# Patient Record
Sex: FEMALE | Race: ASIAN
[De-identification: names, ages, dates, MRNs, and addresses within clinical notes are randomized per-mention and may not be internally consistent; named-entity substitution may affect disease eponyms.]

---

## 2020-09-02 ENCOUNTER — APPOINTMENT (OUTPATIENT)
Dept: PULMONOLOGY | Facility: CLINIC | Age: 40
End: 2020-09-02
Payer: COMMERCIAL

## 2020-09-02 VITALS
TEMPERATURE: 98.5 F | WEIGHT: 150 LBS | OXYGEN SATURATION: 96 % | DIASTOLIC BLOOD PRESSURE: 70 MMHG | SYSTOLIC BLOOD PRESSURE: 102 MMHG | HEIGHT: 68 IN | HEART RATE: 96 BPM | BODY MASS INDEX: 22.73 KG/M2

## 2020-09-02 PROCEDURE — 94727 GAS DIL/WSHOT DETER LNG VOL: CPT

## 2020-09-02 PROCEDURE — 94060 EVALUATION OF WHEEZING: CPT

## 2020-09-02 PROCEDURE — 99204 OFFICE O/P NEW MOD 45 MIN: CPT | Mod: 25

## 2020-09-02 PROCEDURE — 94729 DIFFUSING CAPACITY: CPT

## 2020-09-02 PROCEDURE — 36415 COLL VENOUS BLD VENIPUNCTURE: CPT

## 2020-09-02 NOTE — DISCUSSION/SUMMARY
[FreeTextEntry1] : She is a 40 year old woman complaining of dyspnea on exertion.\par \par She had been in excellent health until about May of 2020. Up to that time she was able to run without any problem. Was able to run 10 miles at a time. Has run several marathons in the past. In May 2020 she got two pet dogs. Her problem seemed to start around this time. \par \par Physical examination was unremarkable. PFT suggested restriction. Pneumonitis and or ILD is a consideration. Blood work obtained. Chest x-ray requested. May need to have CT.\par \par Further recommendations to follow. Follow up in two weeks.  \par

## 2020-09-02 NOTE — HISTORY OF PRESENT ILLNESS
[Never] : never [Former] : former [TextBox_4] : She is a 40 year old woman complaining of dyspnea on exertion.\par \par She had been in excellent health until about May of 2020. Up to that time she was able to run without any problem. Was able to run 10 miles at a time. Has run several marathons in the past. In May 2020 she got two pet dogs. Her problem seemed to start around this time. \par \par No prior history of pulmonary disease. Stopped smoking three years ago.  [YearQuit] : 2017

## 2020-09-02 NOTE — REVIEW OF SYSTEMS
[SOB on Exertion] : sob on exertion [Fever] : no fever [Wheezing] : no wheezing [Nasal Congestion] : no nasal congestion [Cough] : no cough [Chest Discomfort] : no chest discomfort [Edema] : no edema [GERD] : no gerd [Dysuria] : no dysuria [Myalgias] : no myalgias [Raynaud] : no raynaud [Headache] : no headache [Anxiety] : no anxiety [Diabetes] : no diabetes [Thyroid Problem] : no thyroid problem

## 2020-09-02 NOTE — PHYSICAL EXAM
[No Acute Distress] : no acute distress [No Neck Mass] : no neck mass [Normal S1, S2] : normal s1, s2 [Clear to Auscultation Bilaterally] : clear to auscultation bilaterally [No Abnormalities] : no abnormalities [Normal Gait] : normal gait [No HSM] : no hsm [No Clubbing] : no clubbing [No Focal Deficits] : no focal deficits [Oriented x3] : oriented x3

## 2020-09-02 NOTE — PROCEDURE
[FreeTextEntry1] : PFT 9/2/20: No obstruction. Moderate restriction. Moderate reduction in diffusion. No significant improvement after bronchodilator.

## 2020-09-04 LAB
ALBUMIN SERPL ELPH-MCNC: 4.3 G/DL
ALP BLD-CCNC: 43 U/L
ALT SERPL-CCNC: 64 U/L
ANION GAP SERPL CALC-SCNC: 9 MMOL/L
AST SERPL-CCNC: 60 U/L
BASOPHILS # BLD AUTO: 0.06 K/UL
BASOPHILS NFR BLD AUTO: 0.9 %
BILIRUB SERPL-MCNC: 0.6 MG/DL
BUN SERPL-MCNC: 8 MG/DL
CALCIUM SERPL-MCNC: 8.7 MG/DL
CHLORIDE SERPL-SCNC: 104 MMOL/L
CO2 SERPL-SCNC: 26 MMOL/L
CREAT SERPL-MCNC: 0.61 MG/DL
CRP SERPL HS-MCNC: 2.57 MG/L
EOSINOPHIL # BLD AUTO: 0.34 K/UL
EOSINOPHIL NFR BLD AUTO: 4.8 %
GLUCOSE SERPL-MCNC: 101 MG/DL
HCT VFR BLD CALC: 44.2 %
HGB BLD-MCNC: 13.7 G/DL
IMM GRANULOCYTES NFR BLD AUTO: 0.1 %
LYMPHOCYTES # BLD AUTO: 1.68 K/UL
LYMPHOCYTES NFR BLD AUTO: 23.9 %
MAN DIFF?: NORMAL
MCHC RBC-ENTMCNC: 29.4 PG
MCHC RBC-ENTMCNC: 31 GM/DL
MCV RBC AUTO: 94.8 FL
MONOCYTES # BLD AUTO: 0.71 K/UL
MONOCYTES NFR BLD AUTO: 10.1 %
NEUTROPHILS # BLD AUTO: 4.22 K/UL
NEUTROPHILS NFR BLD AUTO: 60.2 %
PLATELET # BLD AUTO: 252 K/UL
POTASSIUM SERPL-SCNC: 4.4 MMOL/L
PROT SERPL-MCNC: 6.8 G/DL
RBC # BLD: 4.66 M/UL
RBC # FLD: 14 %
SARS-COV-2 IGG SERPL IA-ACNC: 0.05 INDEX
SARS-COV-2 IGG SERPL QL IA: NEGATIVE
SODIUM SERPL-SCNC: 140 MMOL/L
WBC # FLD AUTO: 7.02 K/UL

## 2020-09-18 ENCOUNTER — LABORATORY RESULT (OUTPATIENT)
Age: 40
End: 2020-09-18

## 2020-09-18 ENCOUNTER — APPOINTMENT (OUTPATIENT)
Dept: PULMONOLOGY | Facility: CLINIC | Age: 40
End: 2020-09-18
Payer: COMMERCIAL

## 2020-09-18 VITALS
OXYGEN SATURATION: 95 % | HEART RATE: 72 BPM | BODY MASS INDEX: 22.5 KG/M2 | DIASTOLIC BLOOD PRESSURE: 60 MMHG | WEIGHT: 148 LBS | SYSTOLIC BLOOD PRESSURE: 120 MMHG | RESPIRATION RATE: 16 BRPM | TEMPERATURE: 98.4 F

## 2020-09-18 PROCEDURE — 99215 OFFICE O/P EST HI 40 MIN: CPT | Mod: 25

## 2020-09-18 PROCEDURE — 36415 COLL VENOUS BLD VENIPUNCTURE: CPT

## 2020-09-19 LAB
ALBUMIN SERPL ELPH-MCNC: 4.2 G/DL
ALP BLD-CCNC: 40 U/L
ALT SERPL-CCNC: 56 U/L
ANION GAP SERPL CALC-SCNC: 14 MMOL/L
APPEARANCE: ABNORMAL
AST SERPL-CCNC: 54 U/L
BASOPHILS # BLD AUTO: 0.05 K/UL
BASOPHILS NFR BLD AUTO: 0.9 %
BILIRUB SERPL-MCNC: 0.7 MG/DL
BILIRUBIN URINE: NEGATIVE
BLOOD URINE: ABNORMAL
BUN SERPL-MCNC: 11 MG/DL
CALCIUM SERPL-MCNC: 8.6 MG/DL
CHLORIDE SERPL-SCNC: 106 MMOL/L
CK SERPL-CCNC: 1232 U/L
CO2 SERPL-SCNC: 22 MMOL/L
COLOR: YELLOW
CREAT SERPL-MCNC: 0.6 MG/DL
EOSINOPHIL # BLD AUTO: 0.35 K/UL
EOSINOPHIL NFR BLD AUTO: 6.5 %
GLUCOSE QUALITATIVE U: NEGATIVE
GLUCOSE SERPL-MCNC: 113 MG/DL
HCT VFR BLD CALC: 40 %
HGB BLD-MCNC: 12.8 G/DL
IMM GRANULOCYTES NFR BLD AUTO: 0.4 %
KETONES URINE: NEGATIVE
LEUKOCYTE ESTERASE URINE: NEGATIVE
LYMPHOCYTES # BLD AUTO: 1.47 K/UL
LYMPHOCYTES NFR BLD AUTO: 27.3 %
MAN DIFF?: NORMAL
MCHC RBC-ENTMCNC: 29.3 PG
MCHC RBC-ENTMCNC: 32 GM/DL
MCV RBC AUTO: 91.5 FL
MONOCYTES # BLD AUTO: 0.55 K/UL
MONOCYTES NFR BLD AUTO: 10.2 %
NEUTROPHILS # BLD AUTO: 2.95 K/UL
NEUTROPHILS NFR BLD AUTO: 54.7 %
NITRITE URINE: NEGATIVE
PH URINE: 7.5
PLATELET # BLD AUTO: 259 K/UL
POTASSIUM SERPL-SCNC: 4 MMOL/L
PROT SERPL-MCNC: 6.8 G/DL
PROTEIN URINE: NORMAL
RBC # BLD: 4.37 M/UL
RBC # FLD: 13.3 %
RHEUMATOID FACT SER QL: <10 IU/ML
SODIUM SERPL-SCNC: 142 MMOL/L
SPECIFIC GRAVITY URINE: 1.02
UROBILINOGEN URINE: ABNORMAL
WBC # FLD AUTO: 5.39 K/UL

## 2020-09-19 NOTE — DISCUSSION/SUMMARY
[FreeTextEntry1] : She is a 40 year old woman who first noted dyspnea on exertion in April of 2020. Prior to this she had been in excellent health. See the more detailed history noted above. \par \par On examination the palms of her hand have become dry and scaly. Mild fissuring was noted. The hand appear to be slightly swollen. \par \par CT Chest from 9/15/20 shoed increased bibasilar subpleural markings with ground glass opacity and traction bronchiectasis. No honeycombing. No lymphadenopathy. No pleural effusion. 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 \par \par Blood work including myositis specific antibodies will be obtained. It was suggested she be seen at an interstitial lung disease program by an multidisciplinary team. A rheumatology consultation is also advisable.\par \par In addition she recently had a mammogram with an abnormal finding. This should be evaluated by a breast surgeon. Dermatomyositis can be a harbinger for malignancy. \par \par I will follow up with her shortly. \par .  \par

## 2020-09-19 NOTE — PHYSICAL EXAM
[No Neck Mass] : no neck mass [Normal S1, S2] : normal s1, s2 [No HSM] : no hsm [Normal Gait] : normal gait [No Clubbing] : no clubbing [No Focal Deficits] : no focal deficits [Oriented x3] : oriented x3 [Normal Appearance] : normal appearance [No Resp Distress] : no resp distress [Normal Affect] : normal affect [Normal Turgor] : normal turgor [TextBox_132] : Dry scaly skin noted on both palms.

## 2020-09-19 NOTE — HISTORY OF PRESENT ILLNESS
[Former] : former [Never] : never [TextBox_4] : She is a 40 year old woman who presented with dyspnea on exertion which was first noted in April 2020. \par \par Prior to this she had been in good health. She was born in [place] and came to the [place] at the age of 2. She does not recall any childhood illnesses. She has been in excellent health and has been able to run marathons. In April of 2019 she ran a full 26 mile marathon in [place] and another one in November of 2019 in [place]. Earlier this year she was able to run 10 miles without difficulty. In about April of 2020 she started noticing that she could no longer run so well without becoming short of breath. Now she has difficulty in running a 5K race and took her almost one hour to do this.\par \par She has been working from home due to the coronavirus pandemic. Prior to this she was working in [place] and commuting via the subway. In May of this year she adopted two dogs. She has never been allergic to dogs. She has no other pets. She lives in a two-family home with her boyfriend. She resides on the first floor. The home is heated with radiators and there are window units for air conditioning. She maintains these well. She also has two HEPA filters.\par \par She stopped smoking in about 2017. She does not take any medications. She does not have any foreign bodies within her. She traveled to [place] in 2017.  [YearQuit] : 2017

## 2020-09-19 NOTE — REVIEW OF SYSTEMS
[SOB on Exertion] : sob on exertion [Fever] : no fever [Fatigue] : no fatigue [Nasal Congestion] : no nasal congestion [Cough] : no cough [Hemoptysis] : no hemoptysis [Wheezing] : no wheezing [Chest Discomfort] : no chest discomfort [Edema] : no edema [Palpitations] : no palpitations [GERD] : no gerd [Dysuria] : no dysuria [Arthralgias] : no arthralgias [Myalgias] : no myalgias [Raynaud] : no raynaud [Rash] : no rash [Headache] : no headache [Anxiety] : no anxiety [Diabetes] : no diabetes [Thyroid Problem] : no thyroid problem

## 2020-09-19 NOTE — PROCEDURE
[FreeTextEntry1] : PFT 9/2/20: No obstruction. Moderate restriction. Moderate reduction in diffusion. No significant improvement after bronchodilator. \par \par CT Chest 9/15/20: Increased bibasilar subpleural markings with ground glass opacity and traction bronchiectasis. No honeycombing. No lymphadenopathy. No pleural effusion.

## 2020-09-21 ENCOUNTER — LABORATORY RESULT (OUTPATIENT)
Age: 40
End: 2020-09-21

## 2020-09-21 ENCOUNTER — APPOINTMENT (OUTPATIENT)
Dept: PULMONOLOGY | Facility: CLINIC | Age: 40
End: 2020-09-21
Payer: COMMERCIAL

## 2020-09-21 VITALS
WEIGHT: 148 LBS | OXYGEN SATURATION: 96 % | HEART RATE: 62 BPM | SYSTOLIC BLOOD PRESSURE: 104 MMHG | DIASTOLIC BLOOD PRESSURE: 64 MMHG | BODY MASS INDEX: 22.5 KG/M2 | TEMPERATURE: 97.9 F

## 2020-09-21 DIAGNOSIS — Z80.1 FAMILY HISTORY OF MALIGNANT NEOPLASM OF TRACHEA, BRONCHUS AND LUNG: ICD-10-CM

## 2020-09-21 LAB
ACE BLD-CCNC: 24 U/L
CK MB BLD-MCNC: 24.4 NG/ML
CK SERPL-CCNC: 1063 U/L
ENA SCL70 IGG SER IA-ACNC: <0.2 AL
MPO AB + PR3 PNL SER: NORMAL

## 2020-09-21 PROCEDURE — 99213 OFFICE O/P EST LOW 20 MIN: CPT

## 2020-09-21 NOTE — DISCUSSION/SUMMARY
[FreeTextEntry1] : She is a 40 year old woman who first noted dyspnea on exertion in April of 2020. Prior to this she had been in excellent health. See the more detailed history noted above. \par \par On examination the palms of her hand have become dry and scaly. Mild fissuring was noted. The hands appear to be slightly swollen. Multiple papules noted on the back of her neck. \par \par CT Chest from 9/15/20 shoed increased bibasilar subpleural markings with ground glass opacity and traction bronchiectasis. No honeycombing. No lymphadenopathy. No pleural effusion. 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 \par \par Blood work including myositis specific antibodies will be obtained. It was suggested she be seen at an interstitial lung disease program by an multidisciplinary team. A rheumatology consultation is also advisable.\par \par I suggest she keep a photographic record of her skin findings. \par \par Echocardiogram requested. \par \par In addition she recently had a mammogram with an abnormal finding. This should be evaluated by a breast surgeon. Dermatomyositis may be related to malignancy. \par \par Further recommendations to follow the results of the above. \par

## 2020-09-21 NOTE — REVIEW OF SYSTEMS
[SOB on Exertion] : sob on exertion [Fever] : no fever [Fatigue] : no fatigue [Chills] : no chills [Nasal Congestion] : no nasal congestion [Cough] : no cough [Hemoptysis] : no hemoptysis [Wheezing] : no wheezing [Chest Discomfort] : no chest discomfort [Edema] : no edema [Palpitations] : no palpitations [GERD] : no gerd [Dysphagia] : no dysphagia [Dysuria] : no dysuria [Arthralgias] : no arthralgias [Myalgias] : no myalgias [Raynaud] : no raynaud [Rash] : no rash [Headache] : no headache [Anxiety] : no anxiety [Diabetes] : no diabetes [Thyroid Problem] : no thyroid problem [Obesity] : no obesity

## 2020-09-21 NOTE — HISTORY OF PRESENT ILLNESS
[Former] : former [Never] : never [TextBox_4] : She is a 40 year old woman who presented with dyspnea on exertion which was first noted in April 2020. \par \par Prior to this she had been in good health. She was born in [place] and came [place] the [place] at the age of 2. She does not recall any childhood illnesses. She has been in excellent health and has been able to run marathons. In April of 2019 she ran a full 26 mile marathon in [place] and another one in November of 2019 in [place]. Earlier this year she was able to run 10 miles without difficulty. In about April of 2020 she started noticing that she could no longer run so well without becoming short of breath. Now she has difficulty in running a 5K race and took her almost one hour to do this. No myalgias reported. No Raynaud's phenomenon. No hair loss noted. No dysphagia. \par \par She has been working from home due to the coronavirus pandemic. Prior to this she was working in [place] and commuting via the subway. In May of this year she adopted two dogs. She has never been allergic to dogs. She has no other pets. She lives in a two-family home with her boyfriend. She resides on the first floor. The home is heated with radiators and there are window units for air conditioning. She maintains these well. She also has two HEPA filters.\par \par She stopped smoking in about 2017. She does not take any medications. She does not have any foreign bodies within her. She traveled to [place] in 2017.  [YearQuit] : 2017

## 2020-09-21 NOTE — PHYSICAL EXAM
[Normal Appearance] : normal appearance [No Neck Mass] : no neck mass [Normal S1, S2] : normal s1, s2 [No Resp Distress] : no resp distress [No HSM] : no hsm [Normal Gait] : normal gait [No Clubbing] : no clubbing [Normal Turgor] : normal turgor [No Focal Deficits] : no focal deficits [Oriented x3] : oriented x3 [Normal Affect] : normal affect [TextBox_125] : Discrete dark papules noted on the back of her neck. She attributed these to insect bites.  [TextBox_132] : Dry scaly skin noted on both palms.

## 2020-09-22 LAB
ENA JO1 AB SER IA-ACNC: <0.2 AL
HIV1+2 AB SPEC QL IA.RAPID: NONREACTIVE

## 2020-09-23 LAB
ALTERN TENCAPG(M6): <2 MCG/ML
ANA PAT FLD IF-IMP: ABNORMAL
ANA SER IF-ACNC: ABNORMAL
ASPER FUMCAPG(M3): 7.4 MCG/ML
AUREOBASCAPG(M12): <2 MCG/ML
MICROPOLYCAPG(M22): <2 MCG/ML
PENIC CHRYCAPG(M1): 6.6 MCG/ML
PHOMA BETAE IGG: 2.1 MCG/ML
THERMOCAPG(M23): 7 MCG/ML
TRICHODERMA VIRIDE IGG: <2 MCG/ML

## 2020-09-24 LAB
M TB IFN-G BLD-IMP: NEGATIVE
QUANTIFERON TB PLUS MITOGEN MINUS NIL: 8.04 IU/ML
QUANTIFERON TB PLUS NIL: 0.03 IU/ML
QUANTIFERON TB PLUS TB1 MINUS NIL: 0 IU/ML
QUANTIFERON TB PLUS TB2 MINUS NIL: 0 IU/ML

## 2020-09-25 ENCOUNTER — LABORATORY RESULT (OUTPATIENT)
Age: 40
End: 2020-09-25

## 2020-09-25 ENCOUNTER — APPOINTMENT (OUTPATIENT)
Dept: RHEUMATOLOGY | Facility: CLINIC | Age: 40
End: 2020-09-25
Payer: COMMERCIAL

## 2020-09-25 VITALS
BODY MASS INDEX: 22.43 KG/M2 | TEMPERATURE: 97.9 F | HEART RATE: 68 BPM | RESPIRATION RATE: 16 BRPM | WEIGHT: 148 LBS | HEIGHT: 68 IN | DIASTOLIC BLOOD PRESSURE: 71 MMHG | SYSTOLIC BLOOD PRESSURE: 111 MMHG | OXYGEN SATURATION: 98 %

## 2020-09-25 PROCEDURE — 99204 OFFICE O/P NEW MOD 45 MIN: CPT

## 2020-09-26 LAB
LDH SERPL-CCNC: 318 U/L
LDH1 CFR SERPL ELPH: 16 %
LDH2 CFR SERPL ELPH: 38 %
LDH3 CFR SERPL ELPH: 25 %
LDH4 CFR SERPL ELPH: 10 %
LDH5 CFR SERPL ELPH: 10 %

## 2020-10-06 ENCOUNTER — APPOINTMENT (OUTPATIENT)
Dept: PEDIATRIC ALLERGY IMMUNOLOGY | Facility: CLINIC | Age: 40
End: 2020-10-06
Payer: COMMERCIAL

## 2020-10-06 VITALS
DIASTOLIC BLOOD PRESSURE: 62 MMHG | TEMPERATURE: 97.6 F | HEART RATE: 67 BPM | WEIGHT: 146 LBS | BODY MASS INDEX: 22.2 KG/M2 | OXYGEN SATURATION: 98 % | SYSTOLIC BLOOD PRESSURE: 111 MMHG

## 2020-10-06 PROCEDURE — 95004 PERQ TESTS W/ALRGNC XTRCS: CPT

## 2020-10-06 PROCEDURE — 99204 OFFICE O/P NEW MOD 45 MIN: CPT | Mod: 25

## 2020-10-06 PROCEDURE — 36415 COLL VENOUS BLD VENIPUNCTURE: CPT

## 2020-10-06 NOTE — IMPRESSION
[Allergy Testing Dust Mite] : dust mites [Allergy Testing Cockroach] : cockroach [Allergy Testing Dog] : dog [Allergy Testing Cat] : cat [Allergy Testing Trees] : trees [Allergy Testing Weeds] : weeds [Allergy Testing Mixed Feathers] : feathers [] : molds [Allergy Testing Grasses] : grasses

## 2020-10-06 NOTE — PHYSICAL EXAM
[Alert] : alert [Well Nourished] : well nourished [Healthy Appearance] : healthy appearance [No Acute Distress] : no acute distress [Well Developed] : well developed [Normal Voice/Communication] : normal voice communication [No Discharge] : no discharge [No Photophobia] : no photophobia [Sclera Not Icteric] : sclera not icteric [Conjunctival Erythema] : no conjunctival erythema [Suborbital Bogginess] : no suborbital bogginess (allergic shiners) [Normal TMs] : both tympanic membranes were normal [Normal Lips/Tongue] : the lips and tongue were normal [Normal Outer Ear/Nose] : the ears and nose were normal in appearance [No Thrush] : no thrush [No Oral Lesions or Ulcers] : no oral lesions or ulcers [Pale mucosa] : pale mucosa [Boggy Nasal Turbinates] : boggy and/or pale nasal turbinates [Pharyngeal erythema] : no pharyngeal erythema [Posterior Pharyngeal Cobblestoning] : posterior pharyngeal cobblestoning [Exudate] : no exudate [No Neck Mass] : no neck mass was observed [Supple] : the neck was supple [Normal Rate and Effort] : normal respiratory rhythm and effort [No Crackles] : no crackles [Bilateral Audible Breath Sounds] : bilateral audible breath sounds [Wheezing] : no wheezing was heard [Normal Rate] : heart rate was normal  [Normal S1, S2] : normal S1 and S2 [Regular Rhythm] : with a regular rhythm [Soft] : abdomen soft [Not Tender] : non-tender [No HSM] : no hepato-splenomegaly [Normal Cervical Lymph Nodes] : cervical [Normal Axillary Lumph Nodes] : axillary [No Skin Lesions] : no skin lesions [Eczematous Patches] : eczematous patches present [Xerosis] : xerosis [No Edema] : no edema [No Cyanosis] : no cyanosis [Normal Mood] : mood was normal [Normal Affect] : affect was normal [Alert, Awake, Oriented as Age-Appropriate] : alert, awake, oriented as age appropriate [de-identified] : dry, hyperkeratotic lesions on b/l hands, fingers, lower back

## 2020-10-06 NOTE — CONSULT LETTER
[Dear  ___] : Dear  [unfilled], [Consult Letter:] : I had the pleasure of evaluating your patient, [unfilled]. [Please see my note below.] : Please see my note below. [Consult Closing:] : Thank you very much for allowing me to participate in the care of this patient.  If you have any questions, please do not hesitate to contact me. [Sincerely,] : Sincerely, [FreeTextEntry3] : Tatum Motta MD FAAPRESTON, DAMON\par Adult and Pediatric Allergy, Asthma and Clinical Immunology\par  of Medicine and Pediatrics at\par   [facility]\par Section Head, Adult Allergy and Immunology\par   [facility]\par   Division of Allergy, Asthma and Immunology\par   [address] [place] [address], [place] [address]\par   [place] [address]\par   Phone [phone]  Fax [phone]\par \par   [DrSunshine  ___] : Dr. MARADIAGA

## 2020-10-06 NOTE — HISTORY OF PRESENT ILLNESS
[Venom Reactions] : venom reactions [Food Allergies] : food allergies [de-identified] : ERIC MONTERO is a 40 year  old female, presents for allergy evaluation. \par \par She had been in excellent health until about May of 2020. Up to that time she was able to run regularly, including  marathon and half- marathons without any problem.  Her respiratory symptoms have been getting progressively worse. She is seeing Dr Eller, who diagnosed her with ILD. She tried Advair without any improvement in her respiratory symptoms. \par - spirometry: restrictive pattern with DLCO-45%\par - Chest CT report reviewed: subpleural  interstitial densities, ground-glass opacities and traction bronchiectasis (b/l, more in LL)\par - In May 2020 she got two pet dogs. She has multiple potted plants in her living room. 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  \par - Was tested for COVID in June and July - negative, COVID antibody - negative\par - former smoker 20 years 1/2 PPD , quit 3 years ago \par - Has been having rashes on the hands (fingers, knuckles) and lower back. \par - she also reports weight loss about 17 lb since 5/2020\par - she had breast biopsy recently- benign\par \par \par rash

## 2020-10-06 NOTE — REVIEW OF SYSTEMS
[Eye Itching] : itchy eyes [SOB with Exertion] : dyspnea on exertion [Congested In The Chest] : feeling ~L congested in the chest [Nl] : Endocrine [Fatigue] : no fatigue [Fever] : no fever [Rhinorrhea] : no rhinorrhea [Nasal Congestion] : no nasal congestion [Post Nasal Drip] : no post nasal drip [Sneezing] : no sneezing [SOB at Rest] : no shortness of breath at rest [Cough] : no cough [Sputum Production] : not coughing up sputum [Wheezing] : no wheezing [Seizure] : no seizures [Headache] : no headache [Joint Pains] : no arthralgias [Joint Swelling] : no joint swelling [Easy Bruising] : no tendency for easy bruising [Easy Bleeding] : no ~M tendency for easy bleeding [Recurrent Sinus Infections] : no recurrent sinus infections [Recurrent Throat Infections] : no recurrence of throat infections [Recurrent Bronchitis] : no recurrent bronchitis [Recurrent Ear Infections] : no recurrence or ear infections [Recurrent Skin Infections] : no recurrent skin infections [Recurrent Pneumonia] : no ~T recurrent pneumonia [de-identified] : rash

## 2020-10-07 LAB
CD16+CD56+ CELLS # BLD: 222 /UL
CD16+CD56+ CELLS NFR BLD: 15 %
CD19 CELLS NFR BLD: 215 /UL
CD3 CELLS # BLD: 999 /UL
CD3 CELLS NFR BLD: 68 %
CD3+CD4+ CELLS # BLD: 587 /UL
CD3+CD4+ CELLS NFR BLD: 41 %
CD3+CD4+ CELLS/CD3+CD8+ CLL SPEC: 2.59 RATIO
CD3+CD8+ CELLS # SPEC: 227 /UL
CD3+CD8+ CELLS NFR BLD: 16 %
CELLS.CD3-CD19+/CELLS IN BLOOD: 14 %
CH50 SERPL-MCNC: 71 U/ML
M PROTEIN SPEC IFE-MCNC: NORMAL

## 2020-10-09 LAB
C TETANI IGG SER-ACNC: 1.42 IU/ML
COMPLEMENT, ALTERNATE PATHWAY (AH50): 57

## 2020-10-12 LAB — MANNAN BINDING LECTIN (MBL): >4000 NG/ML

## 2020-10-20 ENCOUNTER — APPOINTMENT (OUTPATIENT)
Dept: RHEUMATOLOGY | Facility: CLINIC | Age: 40
End: 2020-10-20
Payer: COMMERCIAL

## 2020-10-20 VITALS
BODY MASS INDEX: 22.28 KG/M2 | DIASTOLIC BLOOD PRESSURE: 69 MMHG | SYSTOLIC BLOOD PRESSURE: 108 MMHG | HEART RATE: 77 BPM | TEMPERATURE: 98 F | WEIGHT: 147 LBS | HEIGHT: 68 IN | OXYGEN SATURATION: 95 %

## 2020-10-20 PROCEDURE — 99072 ADDL SUPL MATRL&STAF TM PHE: CPT

## 2020-10-20 PROCEDURE — 99214 OFFICE O/P EST MOD 30 MIN: CPT | Mod: 25

## 2020-10-21 LAB
ALBUMIN SERPL ELPH-MCNC: 4.4 G/DL
ALP BLD-CCNC: 47 U/L
ALT SERPL-CCNC: 27 U/L
ANION GAP SERPL CALC-SCNC: 19 MMOL/L
AST SERPL-CCNC: 21 U/L
BASOPHILS # BLD AUTO: 0.05 K/UL
BASOPHILS NFR BLD AUTO: 0.4 %
BILIRUB SERPL-MCNC: 0.4 MG/DL
BUN SERPL-MCNC: 18 MG/DL
CALCIUM SERPL-MCNC: 9.4 MG/DL
CHLORIDE SERPL-SCNC: 99 MMOL/L
CK SERPL-CCNC: 145 U/L
CO2 SERPL-SCNC: 22 MMOL/L
CREAT SERPL-MCNC: 0.64 MG/DL
CRP SERPL-MCNC: <0.1 MG/DL
EOSINOPHIL # BLD AUTO: 0.08 K/UL
EOSINOPHIL # BLD MANUAL: 1130 /UL
EOSINOPHIL NFR BLD AUTO: 0.6 %
ERYTHROCYTE [SEDIMENTATION RATE] IN BLOOD BY WESTERGREN METHOD: 9 MM/HR
GLUCOSE SERPL-MCNC: 117 MG/DL
HCT VFR BLD CALC: 44.8 %
HGB BLD-MCNC: 13.5 G/DL
IMM GRANULOCYTES NFR BLD AUTO: 0.5 %
LYMPHOCYTES # BLD AUTO: 1.99 K/UL
LYMPHOCYTES NFR BLD AUTO: 14.5 %
MAN DIFF?: NORMAL
MCHC RBC-ENTMCNC: 29.2 PG
MCHC RBC-ENTMCNC: 30.1 GM/DL
MCV RBC AUTO: 96.8 FL
MONOCYTES # BLD AUTO: 0.45 K/UL
MONOCYTES NFR BLD AUTO: 3.3 %
NEUTROPHILS # BLD AUTO: 11.1 K/UL
NEUTROPHILS NFR BLD AUTO: 80.7 %
PLATELET # BLD AUTO: 288 K/UL
POTASSIUM SERPL-SCNC: 4.7 MMOL/L
PROT SERPL-MCNC: 6.8 G/DL
RBC # BLD: 4.63 M/UL
RBC # FLD: 14.6 %
SODIUM SERPL-SCNC: 140 MMOL/L
WBC # FLD AUTO: 13.74 K/UL

## 2020-10-23 LAB — MYOGLOBIN SERPL-MCNC: 77 NG/ML

## 2020-10-23 RX ORDER — FLUTICASONE PROPIONATE AND SALMETEROL XINAFOATE 45; 21 UG/1; UG/1
45-21 AEROSOL, METERED RESPIRATORY (INHALATION)
Qty: 12 | Refills: 0 | Status: COMPLETED | COMMUNITY
Start: 2020-08-18

## 2020-10-30 ENCOUNTER — RX RENEWAL (OUTPATIENT)
Age: 40
End: 2020-10-30

## 2020-11-13 ENCOUNTER — APPOINTMENT (OUTPATIENT)
Dept: RHEUMATOLOGY | Facility: CLINIC | Age: 40
End: 2020-11-13
Payer: COMMERCIAL

## 2020-11-13 VITALS
DIASTOLIC BLOOD PRESSURE: 85 MMHG | TEMPERATURE: 97.5 F | HEART RATE: 74 BPM | BODY MASS INDEX: 22.13 KG/M2 | OXYGEN SATURATION: 95 % | WEIGHT: 146 LBS | HEIGHT: 68 IN | SYSTOLIC BLOOD PRESSURE: 121 MMHG

## 2020-11-13 PROCEDURE — 99214 OFFICE O/P EST MOD 30 MIN: CPT | Mod: 25

## 2020-11-13 PROCEDURE — 99072 ADDL SUPL MATRL&STAF TM PHE: CPT

## 2020-11-14 ENCOUNTER — RX RENEWAL (OUTPATIENT)
Age: 40
End: 2020-11-14

## 2020-11-14 LAB
ALBUMIN SERPL ELPH-MCNC: 4 G/DL
ALP BLD-CCNC: 37 U/L
ALT SERPL-CCNC: 19 U/L
ANION GAP SERPL CALC-SCNC: 13 MMOL/L
AST SERPL-CCNC: 15 U/L
BASOPHILS # BLD AUTO: 0.04 K/UL
BASOPHILS NFR BLD AUTO: 0.4 %
BILIRUB SERPL-MCNC: 0.4 MG/DL
BUN SERPL-MCNC: 17 MG/DL
CALCIUM SERPL-MCNC: 9.1 MG/DL
CHLORIDE SERPL-SCNC: 103 MMOL/L
CK SERPL-CCNC: 89 U/L
CO2 SERPL-SCNC: 23 MMOL/L
CREAT SERPL-MCNC: 0.81 MG/DL
CRP SERPL-MCNC: <0.1 MG/DL
ENA SS-A AB SER IA-ACNC: <0.2 AL
ENA SS-B AB SER IA-ACNC: <0.2 AL
EOSINOPHIL # BLD AUTO: 0.09 K/UL
EOSINOPHIL # BLD MANUAL: 100 /UL
EOSINOPHIL NFR BLD AUTO: 0.9 %
ERYTHROCYTE [SEDIMENTATION RATE] IN BLOOD BY WESTERGREN METHOD: 6 MM/HR
FERRITIN SERPL-MCNC: 43 NG/ML
GLUCOSE SERPL-MCNC: 90 MG/DL
HCT VFR BLD CALC: 42.3 %
HGB BLD-MCNC: 13.4 G/DL
IMM GRANULOCYTES NFR BLD AUTO: 0.3 %
LDH SERPL-CCNC: 215 U/L
LYMPHOCYTES # BLD AUTO: 3.61 K/UL
LYMPHOCYTES NFR BLD AUTO: 34.9 %
MAN DIFF?: NORMAL
MCHC RBC-ENTMCNC: 30.7 PG
MCHC RBC-ENTMCNC: 31.7 GM/DL
MCV RBC AUTO: 96.8 FL
MONOCYTES # BLD AUTO: 0.79 K/UL
MONOCYTES NFR BLD AUTO: 7.6 %
NEUTROPHILS # BLD AUTO: 5.79 K/UL
NEUTROPHILS NFR BLD AUTO: 55.9 %
PLATELET # BLD AUTO: 262 K/UL
POTASSIUM SERPL-SCNC: 4.1 MMOL/L
PROT SERPL-MCNC: 6.2 G/DL
RBC # BLD: 4.37 M/UL
RBC # FLD: 14.6 %
SODIUM SERPL-SCNC: 139 MMOL/L
WBC # FLD AUTO: 10.35 K/UL

## 2020-11-16 LAB — IGE SER-MCNC: 39 KU/L

## 2020-11-18 ENCOUNTER — APPOINTMENT (OUTPATIENT)
Dept: PEDIATRIC ALLERGY IMMUNOLOGY | Facility: CLINIC | Age: 40
End: 2020-11-18
Payer: COMMERCIAL

## 2020-11-18 VITALS
OXYGEN SATURATION: 96 % | HEIGHT: 68 IN | SYSTOLIC BLOOD PRESSURE: 118 MMHG | BODY MASS INDEX: 22.28 KG/M2 | WEIGHT: 147 LBS | DIASTOLIC BLOOD PRESSURE: 81 MMHG | HEART RATE: 75 BPM | TEMPERATURE: 96.6 F

## 2020-11-18 PROCEDURE — 36415 COLL VENOUS BLD VENIPUNCTURE: CPT

## 2020-11-18 PROCEDURE — 99214 OFFICE O/P EST MOD 30 MIN: CPT | Mod: 25

## 2020-11-20 NOTE — END OF VISIT
[FreeTextEntry3] : I personally discussed this patient with the PA at the time of the visit. I agree with assessment and plan as written unless noted below. \par I was present with the PA during the key portions of the history and exam. I agree with the findings and plan as documented in the PA's note, unless noted below.   \par I was present during entire procedure and assisted PA as needed.\par \par \par 39 yo with ILD, MBL >4K, Allergic Rhinitis:\par - she removed plants from home and started prednisone within couple of days, unclear if removing plants was helpful, effect is due to steroids. Counseling provided. \par - polyclonal gammopathy\par  \par

## 2020-11-20 NOTE — PHYSICAL EXAM
[Alert] : alert [Well Nourished] : well nourished [No Acute Distress] : no acute distress [Well Developed] : well developed [Sclera Not Icteric] : sclera not icteric [Normal TMs] : both tympanic membranes were normal [Normal Tonsils] : normal tonsils [Normal Rate and Effort] : normal respiratory rhythm and effort [No Crackles] : no crackles [Bilateral Audible Breath Sounds] : bilateral audible breath sounds [Normal Rate] : heart rate was normal  [Normal S1, S2] : normal S1 and S2 [Regular Rhythm] : with a regular rhythm [Normal Mood] : mood was normal [Conjunctival Erythema] : no conjunctival erythema [Boggy Nasal Turbinates] : no boggy and/or pale nasal turbinates [Pharyngeal erythema] : no pharyngeal erythema [Posterior Pharyngeal Cobblestoning] : no posterior pharyngeal cobblestoning [Clear Rhinorrhea] : no clear rhinorrhea was seen [Exudate] : no exudate [Wheezing] : no wheezing was heard

## 2020-11-20 NOTE — REVIEW OF SYSTEMS
[Nl] : Integumentary [Fatigue] : no fatigue [Puffy Eyelids] : no puffy ~T eyelids [Bloodshot Eyes] : no bloodshot ~T eyes [Rhinorrhea] : no rhinorrhea [Post Nasal Drip] : no post nasal drip [FreeTextEntry6] : see HPI

## 2020-11-20 NOTE — HISTORY OF PRESENT ILLNESS
[de-identified] : ERIC MONTERO is a 40 year old female, presents for follow up. \par \par Since last visit the patient was placed on 60 mg of oral steroid, which recently was weaned 30 mg with significant improvement of respiratory symptoms. The patient was also started on Cellcept by Rheumatologists for ILD. She stopped Advair in September. \par She still had dogs in her environment, removed all the plants. \par Immunofixation- no monoclonal band. \par \par Allergic rhinitis: Last ST was +dust mite, dog, cat, tree, cr and weed. In the past she was on IT, three doses. Currently on Allegra and Flonase. \par \par Past history. \par She had been in excellent health until about May of 2020. Up to that time she was able to run regularly, including marathon and half- marathons without any problem. Her respiratory symptoms have been getting progressively worse. She is seeing Dr Eller, who diagnosed her with ILD. She tried Advair without any improvement in her respiratory symptoms. \par - spirometry: restrictive pattern with DLCO-45%\par - Chest CT report reviewed: subpleural interstitial densities, ground-glass opacities and traction bronchiectasis (b/l, more in LL)\par - In May 2020 she got two pet dogs. She has multiple potted plants in her living room. 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 \par - Was tested for COVID in June and July - negative, COVID antibody - negative\par - former smoker 20 years 1/2 PPD , quit 3 years ago \par - Has been having rashes on the hands (fingers, knuckles) and lower back. \par - she also reports weight loss about 17 lb since 5/2020\par - she had breast biopsy recently- benign\par

## 2020-12-11 LAB
DEPRECATED S PNEUM 1 IGG SER-MCNC: 3.1 MCG/ML
DEPRECATED S PNEUM12 AB SER-ACNC: <0.4 MCG/ML
DEPRECATED S PNEUM14 AB SER-ACNC: 0.6 MCG/ML
DEPRECATED S PNEUM17 IGG SER IA-MCNC: 0.5 MCG/ML
DEPRECATED S PNEUM18 IGG SER IA-MCNC: <0.4 MCG/ML
DEPRECATED S PNEUM19 IGG SER-MCNC: 0.8 MCG/ML
DEPRECATED S PNEUM19 IGG SER-MCNC: 3.9 MCG/ML
DEPRECATED S PNEUM2 IGG SER-MCNC: 1.2 MCG/ML
DEPRECATED S PNEUM20 IGG SER-MCNC: <0.4 MCG/ML
DEPRECATED S PNEUM22 IGG SER-MCNC: 12.9 MCG/ML
DEPRECATED S PNEUM23 AB SER-ACNC: 14.5 MCG/ML
DEPRECATED S PNEUM3 AB SER-ACNC: 1.5 MCG/ML
DEPRECATED S PNEUM34 IGG SER-MCNC: 0.9 MCG/ML
DEPRECATED S PNEUM4 AB SER-ACNC: 0.8 MCG/ML
DEPRECATED S PNEUM5 IGG SER-MCNC: 3.9 MCG/ML
DEPRECATED S PNEUM6 IGG SER-MCNC: 0.4 MCG/ML
DEPRECATED S PNEUM7 IGG SER-ACNC: 2.9 MCG/ML
DEPRECATED S PNEUM8 AB SER-ACNC: <0.4 MCG/ML
DEPRECATED S PNEUM9 AB SER-ACNC: NORMAL
DEPRECATED S PNEUM9 IGG SER-MCNC: 2.1 MCG/ML
STREPTOCOCCUS PNEUMONIAE SEROTYPE 11A: 0.7 MCG/ML
STREPTOCOCCUS PNEUMONIAE SEROTYPE 15B: 1.3 MCG/ML
STREPTOCOCCUS PNEUMONIAE SEROTYPE 33F: 1.3 MCG/ML

## 2020-12-29 ENCOUNTER — APPOINTMENT (OUTPATIENT)
Dept: RHEUMATOLOGY | Facility: CLINIC | Age: 40
End: 2020-12-29
Payer: COMMERCIAL

## 2020-12-29 VITALS
TEMPERATURE: 93.9 F | OXYGEN SATURATION: 98 % | SYSTOLIC BLOOD PRESSURE: 113 MMHG | DIASTOLIC BLOOD PRESSURE: 75 MMHG | HEIGHT: 68 IN | HEART RATE: 79 BPM | BODY MASS INDEX: 23.04 KG/M2 | WEIGHT: 152 LBS

## 2020-12-29 DIAGNOSIS — K29.60 OTHER GASTRITIS W/OUT BLEEDING: ICD-10-CM

## 2020-12-29 PROCEDURE — G0008: CPT

## 2020-12-29 PROCEDURE — 90686 IIV4 VACC NO PRSV 0.5 ML IM: CPT

## 2020-12-29 PROCEDURE — 99214 OFFICE O/P EST MOD 30 MIN: CPT | Mod: 25

## 2020-12-29 PROCEDURE — 99072 ADDL SUPL MATRL&STAF TM PHE: CPT

## 2021-01-04 LAB
ALBUMIN SERPL ELPH-MCNC: 4.4 G/DL
ALP BLD-CCNC: 35 U/L
ALT SERPL-CCNC: 11 U/L
ANION GAP SERPL CALC-SCNC: 11 MMOL/L
AST SERPL-CCNC: 14 U/L
BASOPHILS # BLD AUTO: 0.05 K/UL
BASOPHILS NFR BLD AUTO: 0.6 %
BILIRUB SERPL-MCNC: 0.5 MG/DL
BUN SERPL-MCNC: 11 MG/DL
CALCIUM SERPL-MCNC: 9.4 MG/DL
CHLORIDE SERPL-SCNC: 100 MMOL/L
CO2 SERPL-SCNC: 28 MMOL/L
CREAT SERPL-MCNC: 0.96 MG/DL
EOSINOPHIL # BLD AUTO: 0.1 K/UL
EOSINOPHIL # BLD MANUAL: 90 /UL
EOSINOPHIL NFR BLD AUTO: 1.2 %
FERRITIN SERPL-MCNC: 41 NG/ML
GLUCOSE SERPL-MCNC: 100 MG/DL
HCT VFR BLD CALC: 41.4 %
HGB BLD-MCNC: 13.5 G/DL
IGE SER-MCNC: 32 KU/L
IMM GRANULOCYTES NFR BLD AUTO: 0.2 %
LYMPHOCYTES # BLD AUTO: 3.55 K/UL
LYMPHOCYTES NFR BLD AUTO: 44.3 %
MAN DIFF?: NORMAL
MCHC RBC-ENTMCNC: 30.7 PG
MCHC RBC-ENTMCNC: 32.6 GM/DL
MCV RBC AUTO: 94.1 FL
MONOCYTES # BLD AUTO: 0.47 K/UL
MONOCYTES NFR BLD AUTO: 5.9 %
NEUTROPHILS # BLD AUTO: 3.82 K/UL
NEUTROPHILS NFR BLD AUTO: 47.8 %
PLATELET # BLD AUTO: 286 K/UL
POTASSIUM SERPL-SCNC: 3.7 MMOL/L
PROT SERPL-MCNC: 6.8 G/DL
RBC # BLD: 4.4 M/UL
RBC # FLD: 13.4 %
SARS-COV-2 IGG SERPL IA-ACNC: 0.06 INDEX
SARS-COV-2 IGG SERPL QL IA: NEGATIVE
SODIUM SERPL-SCNC: 140 MMOL/L
WBC # FLD AUTO: 8.01 K/UL

## 2021-01-14 ENCOUNTER — APPOINTMENT (OUTPATIENT)
Dept: PULMONOLOGY | Facility: CLINIC | Age: 41
End: 2021-01-14

## 2021-01-27 ENCOUNTER — APPOINTMENT (OUTPATIENT)
Dept: RHEUMATOLOGY | Facility: CLINIC | Age: 41
End: 2021-01-27
Payer: COMMERCIAL

## 2021-01-27 LAB — SARS-COV-2 N GENE NPH QL NAA+PROBE: NOT DETECTED

## 2021-01-27 PROCEDURE — 99214 OFFICE O/P EST MOD 30 MIN: CPT | Mod: 95

## 2021-01-28 ENCOUNTER — APPOINTMENT (OUTPATIENT)
Dept: PULMONOLOGY | Facility: CLINIC | Age: 41
End: 2021-01-28
Payer: COMMERCIAL

## 2021-01-28 VITALS
HEIGHT: 68 IN | SYSTOLIC BLOOD PRESSURE: 102 MMHG | RESPIRATION RATE: 17 BRPM | OXYGEN SATURATION: 98 % | TEMPERATURE: 97 F | DIASTOLIC BLOOD PRESSURE: 80 MMHG | HEART RATE: 76 BPM | WEIGHT: 152 LBS | BODY MASS INDEX: 23.04 KG/M2

## 2021-01-28 PROCEDURE — 94727 GAS DIL/WSHOT DETER LNG VOL: CPT

## 2021-01-28 PROCEDURE — 94729 DIFFUSING CAPACITY: CPT

## 2021-01-28 PROCEDURE — 99072 ADDL SUPL MATRL&STAF TM PHE: CPT

## 2021-01-28 PROCEDURE — 94060 EVALUATION OF WHEEZING: CPT

## 2021-01-28 PROCEDURE — 99214 OFFICE O/P EST MOD 30 MIN: CPT | Mod: 25

## 2021-01-28 NOTE — ASSESSMENT
[FreeTextEntry1] : Patient with ILD, in setting of "  hands" and high CPK- emia, highly suggestive of antisynthetase syndrome, but Ni-1 and anti- synthetase ab- negative, SSA - positive\par PFT 9/2/20: No obstruction. Moderate restriction. Moderate reduction in diffusion. No significant improvement after bronchodilator. \par CT Chest 9/15/20: Increased bibasilar subpleural markings with ground glass opacity and traction bronchiectasis. No honeycombing. No lymphadenopathy\par on Cellcept 2.5 g a day and prednisone 10 mg a day\par Eosinophilia - improved\par Gastritis- steroid induced - improved\par \par = labs \par = lower Prednisone 7.5 mg /day x 3 weeks, 5 mg a day for 3weeks, \par = continue CellCept to 1500 mg BID \par = continue Omeprazole 20 mg a day\par = PFT is scheduled for 1/28 /2021\par = COVID vaccine letter\par \par RTO 6 weeks as TELE or in person earlier if needed

## 2021-01-28 NOTE — PROCEDURE
[FreeTextEntry1] : PFT 9/2/20: No obstruction. Moderate restriction. Moderate reduction in diffusion. No significant improvement after bronchodilator. \par \par PFT 1/28/21: Spirometry was normal. Mild restriction. Mild reduction in diffusion. Significant improvement noted when compared to the above study of 9/2/20. \par \par CT Chest 9/15/20: Increased bibasilar subpleural markings with ground glass opacity and traction bronchiectasis. No honeycombing. No lymphadenopathy. No pleural effusion.

## 2021-01-28 NOTE — PHYSICAL EXAM
[General Appearance - Alert] : alert [General Appearance - In No Acute Distress] : in no acute distress [Respiration, Rhythm And Depth] : normal respiratory rhythm and effort [] : no respiratory distress [Oriented To Time, Place, And Person] : oriented to person, place, and time

## 2021-01-28 NOTE — PHYSICAL EXAM
[Normal Appearance] : normal appearance [No Neck Mass] : no neck mass [Normal S1, S2] : normal s1, s2 [No Resp Distress] : no resp distress [No HSM] : no hsm [Normal Gait] : normal gait [No Clubbing] : no clubbing [Normal Turgor] : normal turgor [No Focal Deficits] : no focal deficits [Oriented x3] : oriented x3 [Normal Affect] : normal affect [No Cyanosis] : no cyanosis [No Edema] : no edema

## 2021-01-28 NOTE — DISCUSSION/SUMMARY
[FreeTextEntry1] : She is a 40 year old woman who first noted dyspnea on exertion in April of 2020. Prior to this she had been in excellent health. See the more detailed history noted above. CT Chest from 9/15/20 showed increased bibasilar subpleural markings with ground glass opacity and traction bronchiectasis. No honeycombing. No lymphadenopathy. No pleural effusion. \par \par The constellation of findings suggested a myositis associated interstitial lung disease, anti-synthetase syndrome.  \par \par She has been followed by Rheumatology and is on CellCept and prednisone. She has improved both clinically and functionally. \par \par To follow up with Rheumatology. \par \par Follow up in three months.   \par

## 2021-01-28 NOTE — HISTORY OF PRESENT ILLNESS
[Former] : former [Never] : never [TextBox_4] : She is a 40 year old woman who presented with dyspnea on exertion which was first noted in April 2020. \par \par Prior to that she had been in good health. She was born in [place] and came [place] the [place] at the age of 2. She does not recall any childhood illnesses. She had been in excellent health and was been able to run marathons. In April of 2019 she ran a full 26 mile marathon in [place] and another one in November of 2019 in [place]. In the early part of 2020 she was able to run 10 miles without difficulty. In about April of 2020 she started noticing that she could no longer run so well without becoming short of breath. She had difficulty in running a 5K race and took her almost one hour to do this.\par \par She started working from home due to the coronavirus pandemic. Prior to this she was working in [place] and commuting via the subway. In May of 2020 she adopted two dogs. She was never been allergic to dogs. She had no other pets. She lives in a two-family home with her boyfriend. She resides on the first floor. The home is heated with radiators and there are window units for air conditioning. She maintains these well. She also has two HEPA filters. She stopped smoking in about 2017. She was not taking any medications. She does not have any foreign bodies within her. She traveled to [place] in 2017.  [YearQuit] : 2017

## 2021-01-28 NOTE — HISTORY OF PRESENT ILLNESS
[Home] : at home, [unfilled] , at the time of the visit. [Medical Office: ([facility])___] : at the medical office located in  [Verbal consent obtained from patient] : the patient, [unfilled] [de-identified] : Last was seen in December, 2020 [FreeTextEntry1] : Interval history :\par -------------------\par -  on tapering prednisone , now on 10 mg a day for last 4 weeks and on Cellcept 2.5 g a day \par -  heartburn improved \par -  shortness of breath is significantly improved, able to go to G[employer], running again\par -  small patch of pruritic rash over MCP joint on right hand, no  hands\par -  scheduled for PFT on 1/28/2021\par \par PFT :  9/2020\par FVC      51\par TLC       51\par DLCO    45\par

## 2021-02-11 ENCOUNTER — TRANSCRIPTION ENCOUNTER (OUTPATIENT)
Age: 41
End: 2021-02-11

## 2021-03-12 ENCOUNTER — APPOINTMENT (OUTPATIENT)
Dept: RHEUMATOLOGY | Facility: CLINIC | Age: 41
End: 2021-03-12
Payer: COMMERCIAL

## 2021-03-12 VITALS
HEIGHT: 64 IN | OXYGEN SATURATION: 98 % | TEMPERATURE: 97.6 F | BODY MASS INDEX: 26.29 KG/M2 | WEIGHT: 154 LBS | SYSTOLIC BLOOD PRESSURE: 113 MMHG | DIASTOLIC BLOOD PRESSURE: 77 MMHG | HEART RATE: 64 BPM

## 2021-03-12 PROCEDURE — 99214 OFFICE O/P EST MOD 30 MIN: CPT

## 2021-03-12 PROCEDURE — 99072 ADDL SUPL MATRL&STAF TM PHE: CPT

## 2021-03-15 LAB
ALBUMIN SERPL ELPH-MCNC: 4.6 G/DL
ALP BLD-CCNC: 40 U/L
ALT SERPL-CCNC: 10 U/L
ANION GAP SERPL CALC-SCNC: 10 MMOL/L
AST SERPL-CCNC: 13 U/L
BASOPHILS # BLD AUTO: 0.03 K/UL
BASOPHILS NFR BLD AUTO: 0.5 %
BILIRUB SERPL-MCNC: 0.4 MG/DL
BUN SERPL-MCNC: 12 MG/DL
CALCIUM SERPL-MCNC: 9.4 MG/DL
CHLORIDE SERPL-SCNC: 103 MMOL/L
CK SERPL-CCNC: 79 U/L
CO2 SERPL-SCNC: 26 MMOL/L
COVID-19 NUCLEOCAPSID  GAM ANTIBODY INTERPRETATION: NEGATIVE
CREAT SERPL-MCNC: 0.77 MG/DL
CRP SERPL-MCNC: <3 MG/L
EOSINOPHIL # BLD AUTO: 0.05 K/UL
EOSINOPHIL NFR BLD AUTO: 0.8 %
ERYTHROCYTE [SEDIMENTATION RATE] IN BLOOD BY WESTERGREN METHOD: 11 MM/HR
FERRITIN SERPL-MCNC: 45 NG/ML
GLUCOSE SERPL-MCNC: 111 MG/DL
HCT VFR BLD CALC: 40.7 %
HGB BLD-MCNC: 12.6 G/DL
IMM GRANULOCYTES NFR BLD AUTO: 0.2 %
LYMPHOCYTES # BLD AUTO: 1.54 K/UL
LYMPHOCYTES NFR BLD AUTO: 24 %
MAN DIFF?: NORMAL
MCHC RBC-ENTMCNC: 29.6 PG
MCHC RBC-ENTMCNC: 31 GM/DL
MCV RBC AUTO: 95.5 FL
MONOCYTES # BLD AUTO: 0.39 K/UL
MONOCYTES NFR BLD AUTO: 6.1 %
NEUTROPHILS # BLD AUTO: 4.39 K/UL
NEUTROPHILS NFR BLD AUTO: 68.4 %
PLATELET # BLD AUTO: 291 K/UL
POTASSIUM SERPL-SCNC: 4.9 MMOL/L
PROT SERPL-MCNC: 7.1 G/DL
RBC # BLD: 4.26 M/UL
RBC # FLD: 12.6 %
SARS-COV-2 AB SERPL QL IA: 0.07 INDEX
SODIUM SERPL-SCNC: 139 MMOL/L
WBC # FLD AUTO: 6.41 K/UL

## 2021-04-27 ENCOUNTER — RX RENEWAL (OUTPATIENT)
Age: 41
End: 2021-04-27

## 2021-05-27 ENCOUNTER — APPOINTMENT (OUTPATIENT)
Dept: PEDIATRIC ALLERGY IMMUNOLOGY | Facility: CLINIC | Age: 41
End: 2021-05-27
Payer: COMMERCIAL

## 2021-05-27 PROCEDURE — 99214 OFFICE O/P EST MOD 30 MIN: CPT | Mod: 95

## 2021-05-27 RX ORDER — PREDNISONE 2.5 MG/1
2.5 TABLET ORAL
Qty: 60 | Refills: 1 | Status: COMPLETED | COMMUNITY
Start: 2020-10-06 | End: 2021-05-27

## 2021-05-27 NOTE — HISTORY OF PRESENT ILLNESS
[Home] : at home, [unfilled] , at the time of the visit. [Other Location: e.g. Home (Enter Location, City,State)___] : at [unfilled] [Verbal consent obtained from patient] : the patient, [unfilled] [> or = 20] : > than or = 20 [de-identified] : ERIC MONTERO is a 41 year old female with ILD, former smoker, Allergic Rhinitis, suboptimal pneumococcal titers,  presents for follow up. \par \par - she has been having significant nasal and ocular symptoms this spring. Despite using nasal fluticasone, she gets dry hard balls of mucous. \par - She has been off prednisone for a few weeks, on Cellcept only\par - Stopped Symbicort 9/2020, hasn't been needing short acting bronchodilator; seeing Dr Eller. She was recently able to run 1/2 marathon and feels great\par - She completed Pfiizer COVID-19 vaccination\par \par = 11/2021- immune work up normal, except Strep. penumo- +10/22,\par \par She has dogs removed all the plants. \par Her father passed away from cancer 2/2021. \par Immunofixation- no monoclonal band. \par \par Allergic rhinitis: Last ST was +dust mite, dog, cat, tree, cr and weed. In the past she was on IT.. Currently on Allegra and Flonase. \par \par Past history. \par She had been in excellent health until about May of 2020. Up to that time she was able to run regularly, including marathon and half- marathons without any problem. Her respiratory symptoms have been getting progressively worse. She is seeing Dr Eller, who diagnosed her with ILD. She tried Advair without any improvement in her respiratory symptoms. \par - spirometry: restrictive pattern with DLCO-45%\par - Chest CT report reviewed: subpleural interstitial densities, ground-glass opacities and traction bronchiectasis (b/l, more in [place])\par - In May 2020 she got two pet dogs. She has multiple potted plants in her living room. 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 \par - Was tested for COVID in June and July - negative, COVID antibody - negative\par - former smoker 20 years 1/2 PPD , quit 3 years ago \par - Has been having rashes on the hands (fingers, knuckles) and lower back. \par - she also reports weight loss about 17 lb since 5/2020\par - she had breast biopsy recently- benign\par  [FreeTextEntry7] : 23

## 2021-05-27 NOTE — PHYSICAL EXAM
[Alert] : alert [Well Nourished] : well nourished [Healthy Appearance] : healthy appearance [No Acute Distress] : no acute distress [Normal Voice/Communication] : normal voice communication [Normal Rate and Effort] : normal respiratory rhythm and effort [No Rash] : no rash [Normal Mood] : mood was normal [Normal Affect] : affect was normal [Alert, Awake, Oriented as Age-Appropriate] : alert, awake, oriented as age appropriate

## 2021-05-27 NOTE — REVIEW OF SYSTEMS
[Eye Discharge] : eye discharge [Eye Redness] : redness [Eye Itching] : itchy eyes [Puffy Eyelids] : puffy ~T eyelids [Rhinorrhea] : rhinorrhea [Nasal Congestion] : nasal congestion [Post Nasal Drip] : post nasal drip [Sneezing] : sneezing [Nl] : Hematologic/Lymphatic [Fatigue] : no fatigue [Fever] : no fever [Difficulty Breathing] : no dyspnea [SOB at Rest] : no shortness of breath at rest [SOB with Exertion] : no dyspnea on exertion [Nocturnal Awakening] : no nocturnal awakening with shortness of breath [Cough] : no cough [Sputum Production] : not coughing up sputum [Congested In The Chest] : not feeling ~L congested in the chest [Wheezing] : no wheezing [FreeTextEntry7] : On PPI due to Cellcept

## 2021-06-21 ENCOUNTER — RX RENEWAL (OUTPATIENT)
Age: 41
End: 2021-06-21

## 2021-07-07 ENCOUNTER — NON-APPOINTMENT (OUTPATIENT)
Age: 41
End: 2021-07-07

## 2021-07-07 ENCOUNTER — APPOINTMENT (OUTPATIENT)
Dept: PULMONOLOGY | Facility: CLINIC | Age: 41
End: 2021-07-07
Payer: COMMERCIAL

## 2021-07-07 VITALS
TEMPERATURE: 97.1 F | WEIGHT: 154 LBS | HEART RATE: 86 BPM | DIASTOLIC BLOOD PRESSURE: 76 MMHG | SYSTOLIC BLOOD PRESSURE: 114 MMHG | OXYGEN SATURATION: 97 % | BODY MASS INDEX: 26.43 KG/M2

## 2021-07-07 PROCEDURE — 94729 DIFFUSING CAPACITY: CPT

## 2021-07-07 PROCEDURE — 99072 ADDL SUPL MATRL&STAF TM PHE: CPT

## 2021-07-07 PROCEDURE — 99214 OFFICE O/P EST MOD 30 MIN: CPT | Mod: 25

## 2021-07-07 PROCEDURE — 94727 GAS DIL/WSHOT DETER LNG VOL: CPT

## 2021-07-07 PROCEDURE — 94060 EVALUATION OF WHEEZING: CPT

## 2021-07-07 NOTE — HISTORY OF PRESENT ILLNESS
[Former] : former [Never] : never [TextBox_4] : She is a 41 year old woman who presented with dyspnea on exertion in 2020. \par \par Prior to that she had been in good health. She was born in [place] and [place] at the age of 2. She does not recall any childhood illnesses. She had been in excellent health and was able to run marathons. In April of 2019 she ran a full 26 mile marathon in [place] and another one in November of 2019 in [place]. In the early part of 2020 she was able to run 10 miles without difficulty. In about April of 2020 she started noticing that she could no longer run without becoming short of breath. She had difficulty in running a 5K race and took her almost one hour to do this.In early 2020 she started working from home due to the coronavirus pandemic. Prior to this she was working in [place] and commuting via the subway. In May of 2020 she adopted two dogs. She was never been allergic to dogs. She had no other pets. She lives in a two-family home with her boyfriend. She resides on the first floor. The home is heated with radiators and there are window units for air conditioning. She has two HEPA filters. She stopped smoking in about 2017. She was not taking any medications. She does not have any foreign bodies within her. She traveled to [place] in 2017. 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 She was placed on prednisone and CellCept. Her condition improved. \par \par 7/7/21: Has been running again without a problem. Training for the [employer].  [YearQuit] : 2017

## 2021-07-07 NOTE — DISCUSSION/SUMMARY
[FreeTextEntry1] : She is a 41 year old woman who presented with dyspnea on exertion in 2020. \par \par Prior to that she had been in good health. She was born in [place] and came [place] at the age of 2. She does not recall any childhood illnesses. She had been in excellent health and was able to run marathons. In April of 2019 she ran a full 26 mile marathon in [place] and another one in November of 2019 in [place]. In the early part of 2020 she was able to run 10 miles without difficulty. In about April of 2020 she started noticing that she could no longer run without becoming short of breath. She had difficulty in running a 5K race and took her almost one hour to do this.In early 2020 she started working from home due to the coronavirus pandemic. Prior to this she was working in [place] and commuting via the subway. In May of 2020 she adopted two dogs. She was never been allergic to dogs. She had no other pets. She lives in a two-family home with her boyfriend. She resides on the first floor. The home is heated with radiators and there are window units for air conditioning. She has two HEPA filters. She stopped smoking in about 2017. She was not taking any medications. She does not have any foreign bodies within her. She traveled to [place] in 2017. 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 She was placed on prednisone and CellCept. Her condition improved. \par \par He condition has resolved. She is now running again. No SOB. PFT is normal. Will get follow up CT. \par \par To follow up with Rheumatology. \par \par

## 2021-07-07 NOTE — REVIEW OF SYSTEMS
[Fever] : no fever [Fatigue] : no fatigue [Epistaxis] : no epistaxis [Cough] : no cough [Hemoptysis] : no hemoptysis [Wheezing] : no wheezing [SOB on Exertion] : no sob on exertion [Chest Discomfort] : no chest discomfort [Edema] : no edema [Palpitations] : no palpitations [GERD] : no gerd [Dysuria] : no dysuria [Arthralgias] : no arthralgias [Myalgias] : no myalgias [Raynaud] : no raynaud [Rash] : no rash [Headache] : no headache [Anxiety] : no anxiety [Diabetes] : no diabetes [Thyroid Problem] : no thyroid problem

## 2021-07-07 NOTE — PHYSICAL EXAM
[Normal Appearance] : normal appearance [No Neck Mass] : no neck mass [Normal S1, S2] : normal s1, s2 [No Resp Distress] : no resp distress [No HSM] : no hsm [Normal Gait] : normal gait [No Clubbing] : no clubbing [No Cyanosis] : no cyanosis [No Edema] : no edema [Normal Turgor] : normal turgor [No Focal Deficits] : no focal deficits [Oriented x3] : oriented x3 [Normal Affect] : normal affect [Well Groomed] : well groomed [Clear to Auscultation Bilaterally] : clear to auscultation bilaterally

## 2021-07-07 NOTE — PROCEDURE
[FreeTextEntry1] : PFT 9/2/20: No obstruction. Moderate restriction. Moderate reduction in diffusion. No significant improvement after bronchodilator. \par \par PFT 1/28/21: Spirometry was normal. Mild restriction. Mild reduction in diffusion. Significant improvement noted when compared to the above study of 9/2/20. \par \par PFT 7/7/21: Spirometry was normal. Mild restriction. Diffusion capacity was normal. No significant improvement after bronchodilator. \par \par CT Chest 9/15/20: Increased bibasilar subpleural markings with ground glass opacity and traction bronchiectasis. No honeycombing. No lymphadenopathy. No pleural effusion.

## 2021-07-09 ENCOUNTER — APPOINTMENT (OUTPATIENT)
Dept: PEDIATRIC ALLERGY IMMUNOLOGY | Facility: CLINIC | Age: 41
End: 2021-07-09

## 2021-07-09 ENCOUNTER — APPOINTMENT (OUTPATIENT)
Dept: RHEUMATOLOGY | Facility: CLINIC | Age: 41
End: 2021-07-09
Payer: COMMERCIAL

## 2021-07-09 ENCOUNTER — MED ADMIN CHARGE (OUTPATIENT)
Age: 41
End: 2021-07-09

## 2021-07-09 VITALS
HEIGHT: 64 IN | BODY MASS INDEX: 26.46 KG/M2 | DIASTOLIC BLOOD PRESSURE: 84 MMHG | OXYGEN SATURATION: 98 % | RESPIRATION RATE: 17 BRPM | SYSTOLIC BLOOD PRESSURE: 125 MMHG | HEART RATE: 65 BPM | WEIGHT: 155 LBS | TEMPERATURE: 97.9 F

## 2021-07-09 PROCEDURE — 99214 OFFICE O/P EST MOD 30 MIN: CPT

## 2021-07-09 PROCEDURE — 99072 ADDL SUPL MATRL&STAF TM PHE: CPT

## 2021-07-09 RX ORDER — SULFAMETHOXAZOLE AND TRIMETHOPRIM 800; 160 MG/1; MG/1
800-160 TABLET ORAL DAILY
Qty: 36 | Refills: 3 | Status: DISCONTINUED | COMMUNITY
Start: 2020-11-13 | End: 2021-07-09

## 2021-07-12 LAB
ALBUMIN SERPL ELPH-MCNC: 4.4 G/DL
ALP BLD-CCNC: 42 U/L
ALT SERPL-CCNC: 12 U/L
ANION GAP SERPL CALC-SCNC: 15 MMOL/L
AST SERPL-CCNC: 18 U/L
BASOPHILS # BLD AUTO: 0.05 K/UL
BASOPHILS NFR BLD AUTO: 1 %
BILIRUB SERPL-MCNC: 0.4 MG/DL
BUN SERPL-MCNC: 12 MG/DL
CALCIUM SERPL-MCNC: 9.5 MG/DL
CHLORIDE SERPL-SCNC: 103 MMOL/L
CK SERPL-CCNC: 155 U/L
CO2 SERPL-SCNC: 21 MMOL/L
COVID-19 SPIKE DOMAIN ANTIBODY INTERPRETATION: NEGATIVE
CREAT SERPL-MCNC: 0.75 MG/DL
CRP SERPL-MCNC: <3 MG/L
EOSINOPHIL # BLD AUTO: 0.13 K/UL
EOSINOPHIL NFR BLD AUTO: 2.5 %
ERYTHROCYTE [SEDIMENTATION RATE] IN BLOOD BY WESTERGREN METHOD: 14 MM/HR
GLUCOSE SERPL-MCNC: 96 MG/DL
HCT VFR BLD CALC: 39.6 %
HGB BLD-MCNC: 12.2 G/DL
IMM GRANULOCYTES NFR BLD AUTO: 0.2 %
LYMPHOCYTES # BLD AUTO: 1.9 K/UL
LYMPHOCYTES NFR BLD AUTO: 36.3 %
MAN DIFF?: NORMAL
MCHC RBC-ENTMCNC: 29.5 PG
MCHC RBC-ENTMCNC: 30.8 GM/DL
MCV RBC AUTO: 95.7 FL
MONOCYTES # BLD AUTO: 0.36 K/UL
MONOCYTES NFR BLD AUTO: 6.9 %
NEUTROPHILS # BLD AUTO: 2.78 K/UL
NEUTROPHILS NFR BLD AUTO: 53.1 %
PLATELET # BLD AUTO: 268 K/UL
POTASSIUM SERPL-SCNC: 4.4 MMOL/L
PROT SERPL-MCNC: 6.7 G/DL
RBC # BLD: 4.14 M/UL
RBC # FLD: 13.5 %
SARS-COV-2 AB SERPL IA-ACNC: 0.4 U/ML
SODIUM SERPL-SCNC: 139 MMOL/L
WBC # FLD AUTO: 5.23 K/UL

## 2021-08-16 ENCOUNTER — APPOINTMENT (OUTPATIENT)
Dept: PEDIATRIC ALLERGY IMMUNOLOGY | Facility: CLINIC | Age: 41
End: 2021-08-16
Payer: COMMERCIAL

## 2021-08-16 LAB
DEPRECATED KAPPA LC FREE/LAMBDA SER: 1.02 RATIO
IGA SER QL IEP: 145 MG/DL
IGG SER QL IEP: 1120 MG/DL
IGM SER QL IEP: 64 MG/DL
KAPPA LC CSF-MCNC: 1.05 MG/DL
KAPPA LC SERPL-MCNC: 1.07 MG/DL

## 2021-08-16 PROCEDURE — 36415 COLL VENOUS BLD VENIPUNCTURE: CPT

## 2021-08-28 LAB
DEPRECATED S PNEUM 1 IGG SER-MCNC: 2.8 MCG/ML
DEPRECATED S PNEUM12 AB SER-ACNC: 0.4 MCG/ML
DEPRECATED S PNEUM14 AB SER-ACNC: 0.8 MCG/ML
DEPRECATED S PNEUM17 IGG SER IA-MCNC: 0.6 MCG/ML
DEPRECATED S PNEUM18 IGG SER IA-MCNC: 0.4 MCG/ML
DEPRECATED S PNEUM19 IGG SER-MCNC: 1.7 MCG/ML
DEPRECATED S PNEUM19 IGG SER-MCNC: 4.5 MCG/ML
DEPRECATED S PNEUM2 IGG SER-MCNC: 1.5 MCG/ML
DEPRECATED S PNEUM20 IGG SER-MCNC: 0.4 MCG/ML
DEPRECATED S PNEUM22 IGG SER-MCNC: 12.6 MCG/ML
DEPRECATED S PNEUM23 AB SER-ACNC: 13.4 MCG/ML
DEPRECATED S PNEUM3 AB SER-ACNC: 1.5 MCG/ML
DEPRECATED S PNEUM34 IGG SER-MCNC: 1.1 MCG/ML
DEPRECATED S PNEUM4 AB SER-ACNC: 0.8 MCG/ML
DEPRECATED S PNEUM5 IGG SER-MCNC: 4.4 MCG/ML
DEPRECATED S PNEUM6 IGG SER-MCNC: 0.5 MCG/ML
DEPRECATED S PNEUM7 IGG SER-ACNC: 3 MCG/ML
DEPRECATED S PNEUM8 AB SER-ACNC: 0.4 MCG/ML
DEPRECATED S PNEUM9 AB SER-ACNC: NORMAL
DEPRECATED S PNEUM9 IGG SER-MCNC: 2 MCG/ML
STREPTOCOCCUS PNEUMONIAE SEROTYPE 11A: 1.1 MCG/ML
STREPTOCOCCUS PNEUMONIAE SEROTYPE 15B: 1 MCG/ML
STREPTOCOCCUS PNEUMONIAE SEROTYPE 33F: 1.4 MCG/ML

## 2021-09-02 ENCOUNTER — APPOINTMENT (OUTPATIENT)
Dept: PEDIATRIC ALLERGY IMMUNOLOGY | Facility: CLINIC | Age: 41
End: 2021-09-02
Payer: COMMERCIAL

## 2021-09-02 DIAGNOSIS — J30.1 ALLERGIC RHINITIS DUE TO POLLEN: ICD-10-CM

## 2021-09-02 DIAGNOSIS — J30.89 OTHER ALLERGIC RHINITIS: ICD-10-CM

## 2021-09-02 DIAGNOSIS — J30.81 ALLERGIC RHINITIS DUE TO ANIMAL (CAT) (DOG) HAIR AND DANDER: ICD-10-CM

## 2021-09-02 PROCEDURE — 99442: CPT

## 2021-09-02 RX ORDER — OMEPRAZOLE 20 MG/1
20 CAPSULE, DELAYED RELEASE ORAL
Qty: 90 | Refills: 1 | Status: COMPLETED | COMMUNITY
Start: 2020-12-29 | End: 2021-09-02

## 2021-09-02 RX ORDER — FLUTICASONE PROPIONATE 50 UG/1
50 SPRAY, METERED NASAL DAILY
Qty: 3 | Refills: 3 | Status: ACTIVE | COMMUNITY
Start: 2020-10-06 | End: 1900-01-01

## 2021-09-02 RX ORDER — MONTELUKAST 10 MG/1
10 TABLET, FILM COATED ORAL
Qty: 1 | Refills: 1 | Status: DISCONTINUED | COMMUNITY
Start: 2021-05-27 | End: 2021-09-02

## 2021-09-02 NOTE — HISTORY OF PRESENT ILLNESS
[Home] : at home, [unfilled] , at the time of the visit. [Other Location: e.g. Home (Enter Location, City,State)___] : at [unfilled] [Verbal consent obtained from patient] : the patient, [unfilled] [de-identified] : ERIC MONTERO is a 41 year old female with ILD/antisynthase syndrome, former smoker, Allergic Rhinitis, suboptimal pneumococcal titers,  presents for follow up. \par \par - she went down 1500 mg a day about a month ago (down from 2500 mg)\par - she received Pfizer-BioNTech COVID-19 Vaccine, last dose 4/4/21 \par - She has been off prednisone since 4/2021\par - Stopped Symbicort 9/2020, hasn't been needing short acting bronchodilator; seeing Dr Eller. She is practicing to run a marathon and feels great\par - her allergic symptoms are significantly better on nasal fluticasone and desloratadine (takes 1 or 2 a day). She stopped Montelukast.\par \par = 11/2021- immune work up normal, except Strep. penumo- +10/22, received Pneumovax 7/9/2021 = > + 11/22\par \par She has dogs, removed all the plants. \par Her father passed away from cancer 2/2021. \par Immunofixation- no monoclonal band. \par \par Allergic rhinitis: Last ST was +dust mite, dog, cat, tree, cr and weed. In the past she was on IT.. Currently on Allegra and Flonase. \par \par Past history. \par She had been in excellent health until about May of 2020. Up to that time she was able to run regularly, including marathon and half- marathons without any problem. Her respiratory symptoms have been getting progressively worse. She is seeing Dr Eller, who diagnosed her with ILD. She tried Advair without any improvement in her respiratory symptoms. \par - spirometry: restrictive pattern with DLCO-45%\par - Chest CT report reviewed: subpleural interstitial densities, ground-glass opacities and traction bronchiectasis (b/l, more in [place])\par - In May 2020 she got two pet dogs. She has multiple potted plants in her living room. 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 \par - Was tested for COVID in June and July - negative, COVID antibody - negative\par - former smoker 20 years 1/2 PPD , quit 3 years ago \par - Has been having rashes on the hands (fingers, knuckles) and lower back. \par - she also reports weight loss about 17 lb since 5/2020\par - she had breast biopsy recently- benign\par

## 2021-09-13 ENCOUNTER — TRANSCRIPTION ENCOUNTER (OUTPATIENT)
Age: 41
End: 2021-09-13

## 2021-11-05 ENCOUNTER — APPOINTMENT (OUTPATIENT)
Dept: RHEUMATOLOGY | Facility: CLINIC | Age: 41
End: 2021-11-05
Payer: COMMERCIAL

## 2021-11-05 VITALS
TEMPERATURE: 97.8 F | SYSTOLIC BLOOD PRESSURE: 113 MMHG | BODY MASS INDEX: 26.46 KG/M2 | HEIGHT: 64 IN | WEIGHT: 155 LBS | OXYGEN SATURATION: 98 % | DIASTOLIC BLOOD PRESSURE: 77 MMHG | HEART RATE: 61 BPM

## 2021-11-05 PROCEDURE — 99214 OFFICE O/P EST MOD 30 MIN: CPT

## 2021-11-08 LAB
ALBUMIN SERPL ELPH-MCNC: 4.6 G/DL
ALP BLD-CCNC: 47 U/L
ALT SERPL-CCNC: 17 U/L
ANION GAP SERPL CALC-SCNC: 12 MMOL/L
AST SERPL-CCNC: 20 U/L
BASOPHILS # BLD AUTO: 0.05 K/UL
BASOPHILS NFR BLD AUTO: 0.9 %
BILIRUB SERPL-MCNC: 0.7 MG/DL
BUN SERPL-MCNC: 12 MG/DL
CALCIUM SERPL-MCNC: 9.5 MG/DL
CHLORIDE SERPL-SCNC: 101 MMOL/L
CK SERPL-CCNC: 157 U/L
CO2 SERPL-SCNC: 24 MMOL/L
COVID-19 SPIKE DOMAIN ANTIBODY INTERPRETATION: POSITIVE
CREAT SERPL-MCNC: 0.68 MG/DL
CRP SERPL-MCNC: <3 MG/L
EOSINOPHIL # BLD AUTO: 0.15 K/UL
EOSINOPHIL NFR BLD AUTO: 2.6 %
ERYTHROCYTE [SEDIMENTATION RATE] IN BLOOD BY WESTERGREN METHOD: 9 MM/HR
GLUCOSE SERPL-MCNC: 109 MG/DL
HCT VFR BLD CALC: 39.9 %
HGB BLD-MCNC: 13.2 G/DL
IMM GRANULOCYTES NFR BLD AUTO: 0.2 %
LDH SERPL-CCNC: 162 U/L
LYMPHOCYTES # BLD AUTO: 2.15 K/UL
LYMPHOCYTES NFR BLD AUTO: 37 %
MAN DIFF?: NORMAL
MCHC RBC-ENTMCNC: 29.9 PG
MCHC RBC-ENTMCNC: 33.1 GM/DL
MCV RBC AUTO: 90.5 FL
MONOCYTES # BLD AUTO: 0.43 K/UL
MONOCYTES NFR BLD AUTO: 7.4 %
MYOGLOBIN SERPL-MCNC: <21 NG/ML
NEUTROPHILS # BLD AUTO: 3.02 K/UL
NEUTROPHILS NFR BLD AUTO: 51.9 %
PLATELET # BLD AUTO: 244 K/UL
POTASSIUM SERPL-SCNC: 4.2 MMOL/L
PROT SERPL-MCNC: 6.9 G/DL
RBC # BLD: 4.41 M/UL
RBC # FLD: 13.1 %
SARS-COV-2 AB SERPL IA-ACNC: 10.7 U/ML
SODIUM SERPL-SCNC: 138 MMOL/L
WBC # FLD AUTO: 5.81 K/UL

## 2021-11-12 ENCOUNTER — APPOINTMENT (OUTPATIENT)
Dept: CT IMAGING | Facility: IMAGING CENTER | Age: 41
End: 2021-11-12

## 2021-12-17 ENCOUNTER — APPOINTMENT (OUTPATIENT)
Dept: RHEUMATOLOGY | Facility: CLINIC | Age: 41
End: 2021-12-17
Payer: COMMERCIAL

## 2021-12-17 VITALS
HEIGHT: 66 IN | DIASTOLIC BLOOD PRESSURE: 81 MMHG | BODY MASS INDEX: 24.91 KG/M2 | OXYGEN SATURATION: 97 % | WEIGHT: 155 LBS | TEMPERATURE: 98.1 F | SYSTOLIC BLOOD PRESSURE: 121 MMHG | HEART RATE: 62 BPM

## 2021-12-17 PROCEDURE — 99215 OFFICE O/P EST HI 40 MIN: CPT

## 2021-12-20 LAB
ALBUMIN SERPL ELPH-MCNC: 4.8 G/DL
ALP BLD-CCNC: 56 U/L
ALT SERPL-CCNC: 19 U/L
ANION GAP SERPL CALC-SCNC: 12 MMOL/L
AST SERPL-CCNC: 22 U/L
BASOPHILS # BLD AUTO: 0.05 K/UL
BASOPHILS NFR BLD AUTO: 0.8 %
BILIRUB SERPL-MCNC: 0.5 MG/DL
BUN SERPL-MCNC: 15 MG/DL
CALCIUM SERPL-MCNC: 9.5 MG/DL
CHLORIDE SERPL-SCNC: 103 MMOL/L
CK SERPL-CCNC: 125 U/L
CO2 SERPL-SCNC: 24 MMOL/L
COVID-19 SPIKE DOMAIN ANTIBODY INTERPRETATION: POSITIVE
CREAT SERPL-MCNC: 0.71 MG/DL
EOSINOPHIL # BLD AUTO: 0.2 K/UL
EOSINOPHIL NFR BLD AUTO: 3.3 %
GLUCOSE SERPL-MCNC: 111 MG/DL
HCT VFR BLD CALC: 42 %
HGB BLD-MCNC: 13.8 G/DL
IMM GRANULOCYTES NFR BLD AUTO: 0.2 %
LYMPHOCYTES # BLD AUTO: 2.23 K/UL
LYMPHOCYTES NFR BLD AUTO: 36.6 %
MAN DIFF?: NORMAL
MCHC RBC-ENTMCNC: 30.3 PG
MCHC RBC-ENTMCNC: 32.9 GM/DL
MCV RBC AUTO: 92.3 FL
MONOCYTES # BLD AUTO: 0.42 K/UL
MONOCYTES NFR BLD AUTO: 6.9 %
NEUTROPHILS # BLD AUTO: 3.18 K/UL
NEUTROPHILS NFR BLD AUTO: 52.2 %
PLATELET # BLD AUTO: 252 K/UL
POTASSIUM SERPL-SCNC: 4.4 MMOL/L
PROT SERPL-MCNC: 7.5 G/DL
RBC # BLD: 4.55 M/UL
RBC # FLD: 12.5 %
SARS-COV-2 AB SERPL IA-ACNC: 12.8 U/ML
SODIUM SERPL-SCNC: 139 MMOL/L
WBC # FLD AUTO: 6.09 K/UL

## 2021-12-21 ENCOUNTER — APPOINTMENT (OUTPATIENT)
Dept: SURGERY | Facility: CLINIC | Age: 41
End: 2021-12-21
Payer: COMMERCIAL

## 2021-12-21 VITALS
HEART RATE: 66 BPM | HEIGHT: 67 IN | SYSTOLIC BLOOD PRESSURE: 118 MMHG | BODY MASS INDEX: 24.33 KG/M2 | WEIGHT: 155 LBS | DIASTOLIC BLOOD PRESSURE: 81 MMHG

## 2021-12-21 PROCEDURE — 99203 OFFICE O/P NEW LOW 30 MIN: CPT

## 2021-12-23 NOTE — PHYSICAL EXAM
[Normal] : no neck adenopathy [de-identified] : Fullness anterior upper neck at level of hyoid and area of patient's concern. no cervical or supraclavicular adenopathy, trachea midline, thyroid without enlargement or palpable mass [de-identified] : Skin:  normal appearance.  no rash, nodules, vesicles, or erythema,\par Musculoskeletal:  full range of motion and no deformities appreciated\par Neurological:  grossly intact\par Psychiatric:  oriented to person, place and time with appropriate affect

## 2021-12-23 NOTE — REASON FOR VISIT
[Initial Consultation] : an initial consultation for [Other: _____] : [unfilled] [FreeTextEntry2] : Anterior neck mass

## 2021-12-23 NOTE — HISTORY OF PRESENT ILLNESS
[de-identified] : Patient referred by Dr. Molina for evaluation of anterior neck mass.  1 week ago patient noted anterior upper neck mass, denies tenderness, fever, chills, sweats or prior history of thyroid disease.  Patient reports seasonal allergies.  Neck ultrasound December 17, 2021: 9 x 5 x 8 mm marginated hypoechoic lymph node with central fatty hilum in the area of patient's palpable concern.  No adenopathy along cervical chains bilaterally.  Salivary glands appear homogeneous. I have reviewed all old and new data and available images.

## 2021-12-23 NOTE — ASSESSMENT
[FreeTextEntry1] : Patient with anterior neck mass 9 mm.  Possible benign subcentimeter lymph node versus thyroglossal duct cyst.  I have recommended a follow-up ultrasound in March 2022.  If the mass enlarges or becomes symptomatic, intervention may be necessary.  RTO 3 months. I have answered their questions to the best of my ability.\par

## 2021-12-23 NOTE — CONSULT LETTER
[Dear  ___] : Dear  [unfilled], [Consult Letter:] : I had the pleasure of evaluating your patient, [unfilled]. [Please see my note below.] : Please see my note below. [Consult Closing:] : Thank you very much for allowing me to participate in the care of this patient.  If you have any questions, please do not hesitate to contact me. [Sincerely,] : Sincerely, [FreeTextEntry2] : Dr. Harika Molina [FreeTextEntry3] : Comfort Jain MD, FACS\par Assistant Professor of Surgery and Otolaryngology\par [facility]\par

## 2022-01-07 ENCOUNTER — NON-APPOINTMENT (OUTPATIENT)
Age: 42
End: 2022-01-07

## 2022-01-12 ENCOUNTER — APPOINTMENT (OUTPATIENT)
Dept: PULMONOLOGY | Facility: CLINIC | Age: 42
End: 2022-01-12

## 2022-01-13 NOTE — HISTORY OF PRESENT ILLNESS
[FreeTextEntry1] : From: Harika Molina <[email or web address] Sent: Friday, January 7, 2022 10:55 AM\par To: Nunu Yates <[email or web address] Subject: Re: [EXTERNAL] Fwd: COVID-19 test result \par  \par She should get a booster now \par Sent from my iPhone\par \par \par On Jan 7, 2022, at 10:36 AM, Nunu Yates <[email or web address]> wrote:\par  \par \par \par ________________________________________\par From: Giovana Toney <[email or web address] Sent: Friday, January 7, 2022 8:57 AM\par To: Nunu Yates <[email or web address]>\par Subject: [EXTERNAL] Fwd: COVID-19 test result \par  \par External Email. Do not click links or open attachments unless you trust the sender and content. Report suspicious emails using Report Phishing button or forward email to [email or web address]\par Hi \par \par The test came back negative, i've attached the results\par \par Let me know what Dr. Molina thinks I should get the 4th vaccine and what type\par \par 1st - 3/14/2021 Pfizer \par 2nd - 04/04/2021 Pfizer \par 3rd - 09/16/2021  Pfizer \par \par I read that [email or web address]://[email or web address]/us/additional-doses-[email or web address]-vaccine.[email or web address] 6 months is good to get the 4th after the third but I wanted to run it by Dr. Molina first.  She did say i should get it when they allow it but I still wanted to run it by her.  Please also ask what I should do about the medication.  I'm currently only on Cellcept 500mg (1 pill a day).  Thanks!\par \par \par Best\par Youmee\par \par ---------- Forwarded message ---------\par From: <[email or web address] Date: Fri, Jan 7, 2022 at 12:00 AM\par Subject: COVID-19 test result\par To: <[email or web address]>\par \par \par Hello YOUMEE IM,\par \par Attached please find your COVID-19 test result.\par \par Regards,\par \par [facility]\par \par

## 2022-02-14 ENCOUNTER — TRANSCRIPTION ENCOUNTER (OUTPATIENT)
Age: 42
End: 2022-02-14

## 2022-02-24 ENCOUNTER — TRANSCRIPTION ENCOUNTER (OUTPATIENT)
Age: 42
End: 2022-02-24

## 2022-02-28 ENCOUNTER — TRANSCRIPTION ENCOUNTER (OUTPATIENT)
Age: 42
End: 2022-02-28

## 2022-03-08 ENCOUNTER — APPOINTMENT (OUTPATIENT)
Dept: RHEUMATOLOGY | Facility: CLINIC | Age: 42
End: 2022-03-08
Payer: COMMERCIAL

## 2022-03-08 VITALS
BODY MASS INDEX: 24.8 KG/M2 | HEIGHT: 67 IN | HEART RATE: 62 BPM | DIASTOLIC BLOOD PRESSURE: 91 MMHG | TEMPERATURE: 97.6 F | WEIGHT: 158 LBS | OXYGEN SATURATION: 98 % | SYSTOLIC BLOOD PRESSURE: 128 MMHG

## 2022-03-08 PROCEDURE — 99214 OFFICE O/P EST MOD 30 MIN: CPT

## 2022-03-10 LAB
ALBUMIN SERPL ELPH-MCNC: 4.6 G/DL
ALP BLD-CCNC: 51 U/L
ALT SERPL-CCNC: 16 U/L
ANION GAP SERPL CALC-SCNC: 12 MMOL/L
AST SERPL-CCNC: 17 U/L
BASOPHILS # BLD AUTO: 0.04 K/UL
BASOPHILS NFR BLD AUTO: 0.7 %
BILIRUB SERPL-MCNC: 0.8 MG/DL
BUN SERPL-MCNC: 10 MG/DL
CALCIUM SERPL-MCNC: 9.3 MG/DL
CHLORIDE SERPL-SCNC: 104 MMOL/L
CK SERPL-CCNC: 111 U/L
CO2 SERPL-SCNC: 24 MMOL/L
COVID-19 NUCLEOCAPSID  GAM ANTIBODY INTERPRETATION: NEGATIVE
COVID-19 SPIKE DOMAIN ANTIBODY INTERPRETATION: POSITIVE
CREAT SERPL-MCNC: 0.67 MG/DL
EGFR: 110 ML/MIN/1.73M2
EOSINOPHIL # BLD AUTO: 0.14 K/UL
EOSINOPHIL NFR BLD AUTO: 2.4 %
GLUCOSE SERPL-MCNC: 81 MG/DL
HCT VFR BLD CALC: 39.1 %
HGB BLD-MCNC: 13.1 G/DL
IMM GRANULOCYTES NFR BLD AUTO: 0.2 %
LYMPHOCYTES # BLD AUTO: 2.61 K/UL
LYMPHOCYTES NFR BLD AUTO: 43.9 %
MAN DIFF?: NORMAL
MCHC RBC-ENTMCNC: 31 PG
MCHC RBC-ENTMCNC: 33.5 GM/DL
MCV RBC AUTO: 92.4 FL
MONOCYTES # BLD AUTO: 0.45 K/UL
MONOCYTES NFR BLD AUTO: 7.6 %
NEUTROPHILS # BLD AUTO: 2.69 K/UL
NEUTROPHILS NFR BLD AUTO: 45.2 %
PLATELET # BLD AUTO: 261 K/UL
POTASSIUM SERPL-SCNC: 4.6 MMOL/L
PROT SERPL-MCNC: 7 G/DL
RBC # BLD: 4.23 M/UL
RBC # FLD: 12.6 %
SARS-COV-2 AB SERPL IA-ACNC: >250 U/ML
SARS-COV-2 AB SERPL QL IA: 0.08 INDEX
SODIUM SERPL-SCNC: 140 MMOL/L
WBC # FLD AUTO: 5.94 K/UL

## 2022-03-16 ENCOUNTER — APPOINTMENT (OUTPATIENT)
Dept: PULMONOLOGY | Facility: CLINIC | Age: 42
End: 2022-03-16

## 2022-03-24 ENCOUNTER — APPOINTMENT (OUTPATIENT)
Dept: SURGERY | Facility: CLINIC | Age: 42
End: 2022-03-24

## 2022-03-31 NOTE — PHYSICAL EXAM
[Normal Appearance] : normal appearance [Well Groomed] : well groomed [No Neck Mass] : no neck mass [Normal S1, S2] : normal s1, s2 [No Resp Distress] : no resp distress [Clear to Auscultation Bilaterally] : clear to auscultation bilaterally [No HSM] : no hsm [Normal Gait] : normal gait [No Clubbing] : no clubbing [No Cyanosis] : no cyanosis [No Edema] : no edema [Normal Turgor] : normal turgor [No Focal Deficits] : no focal deficits [Oriented x3] : oriented x3 [Normal Affect] : normal affect

## 2022-04-01 ENCOUNTER — APPOINTMENT (OUTPATIENT)
Dept: PULMONOLOGY | Facility: CLINIC | Age: 42
End: 2022-04-01
Payer: COMMERCIAL

## 2022-04-01 VITALS
SYSTOLIC BLOOD PRESSURE: 116 MMHG | BODY MASS INDEX: 24.43 KG/M2 | WEIGHT: 156 LBS | HEART RATE: 71 BPM | DIASTOLIC BLOOD PRESSURE: 78 MMHG | OXYGEN SATURATION: 94 % | TEMPERATURE: 98.7 F

## 2022-04-01 PROCEDURE — 94727 GAS DIL/WSHOT DETER LNG VOL: CPT

## 2022-04-01 PROCEDURE — 94729 DIFFUSING CAPACITY: CPT

## 2022-04-01 PROCEDURE — 94060 EVALUATION OF WHEEZING: CPT

## 2022-04-01 PROCEDURE — 99214 OFFICE O/P EST MOD 30 MIN: CPT | Mod: 25

## 2022-04-01 NOTE — PROCEDURE
[FreeTextEntry1] : PFT 9/2/20: No obstruction. Moderate restriction. Moderate reduction in diffusion. No significant improvement after bronchodilator. \par \par PFT 1/28/21: Spirometry was normal. Mild restriction. Mild reduction in diffusion. Significant improvement noted when compared to the above study of 9/2/20. \par \par PFT 7/7/21: Spirometry was normal. Mild restriction. Diffusion capacity was normal. No significant improvement after bronchodilator. \par \par PFT 4/1/22: Spirometry was normal. Lung volumes were normal. Diffusion capacity was normal. No significant improvement after bronchodilator. \par \par CT Chest 9/15/20: Increased bibasilar subpleural markings with ground glass opacity and traction bronchiectasis. No honeycombing. No lymphadenopathy. No pleural effusion. \par \par CT Chest 11/16/21: Decreased interstitial and ground glass opacities.

## 2022-04-01 NOTE — HISTORY OF PRESENT ILLNESS
[Former] : former [Never] : never [TextBox_4] : She is a 42 year old woman who presented with dyspnea on exertion in September of 2020. \par \par Prior to that she had been in good health. She was born in [place] and came [place] the [place] at the age of 2. She does not recall any childhood illnesses. She had been in excellent health and was able to run marathons. In April of 2019 she ran a full 26 mile marathon in [place] and another one in November of 2019 in [place]. In the early part of 2020 she was able to run 10 miles without difficulty. In  April 2020 she started noticing that she could no longer run without becoming short of breath. She had difficulty in running a 5K race and took her almost one hour to do this. In early 2020 she started working from home due to the coronavirus pandemic. 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 In May of 2020 she adopted two dogs. She was never been allergic to dogs. She had no other pets. She lives in a two-family home with her boyfriend. She resides on the first floor. The home is heated with radiators and there are window units for air conditioning. She has two HEPA filters. She stopped smoking in about 2017. She was not taking any medications. She traveled to [place] in 2017. 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 She was placed on prednisone and CellCept. Her condition improved. \par \par 7/7/21: Has been running again without a problem. 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 \par \par 4/1/22: Recently ran a one half marathon without difficulty. No longer on CellCept - for past month. \par  [YearQuit] : 2017

## 2022-04-01 NOTE — REVIEW OF SYSTEMS
[Fever] : no fever [Fatigue] : no fatigue [Sinus Problems] : no sinus problems [Cough] : no cough [Sputum] : no sputum [Wheezing] : no wheezing [SOB on Exertion] : no sob on exertion [Chest Discomfort] : no chest discomfort [Edema] : no edema [Palpitations] : no palpitations [GERD] : no gerd [Dysuria] : no dysuria [Arthralgias] : no arthralgias [Myalgias] : no myalgias [Raynaud] : no raynaud [Rash] : no rash [Headache] : no headache [Anxiety] : no anxiety [Diabetes] : no diabetes [Thyroid Problem] : no thyroid problem

## 2022-05-02 ENCOUNTER — TRANSCRIPTION ENCOUNTER (OUTPATIENT)
Age: 42
End: 2022-05-02

## 2022-06-16 ENCOUNTER — TRANSCRIPTION ENCOUNTER (OUTPATIENT)
Age: 42
End: 2022-06-16

## 2022-07-08 ENCOUNTER — APPOINTMENT (OUTPATIENT)
Dept: RHEUMATOLOGY | Facility: CLINIC | Age: 42
End: 2022-07-08

## 2022-07-08 VITALS
TEMPERATURE: 98.2 F | WEIGHT: 156 LBS | HEIGHT: 67 IN | SYSTOLIC BLOOD PRESSURE: 132 MMHG | HEART RATE: 60 BPM | OXYGEN SATURATION: 98 % | DIASTOLIC BLOOD PRESSURE: 91 MMHG | BODY MASS INDEX: 24.48 KG/M2

## 2022-07-08 PROCEDURE — 99213 OFFICE O/P EST LOW 20 MIN: CPT

## 2022-07-11 LAB
ALBUMIN SERPL ELPH-MCNC: 4.4 G/DL
ALP BLD-CCNC: 46 U/L
ALT SERPL-CCNC: 18 U/L
ANION GAP SERPL CALC-SCNC: 13 MMOL/L
AST SERPL-CCNC: 18 U/L
BASOPHILS # BLD AUTO: 0.03 K/UL
BASOPHILS NFR BLD AUTO: 0.5 %
BILIRUB SERPL-MCNC: 1.4 MG/DL
BUN SERPL-MCNC: 10 MG/DL
CALCIUM SERPL-MCNC: 9.1 MG/DL
CHLORIDE SERPL-SCNC: 105 MMOL/L
CK SERPL-CCNC: 173 U/L
CO2 SERPL-SCNC: 23 MMOL/L
CREAT SERPL-MCNC: 0.74 MG/DL
CRP SERPL-MCNC: <3 MG/L
EGFR: 102 ML/MIN/1.73M2
EOSINOPHIL # BLD AUTO: 0.1 K/UL
EOSINOPHIL NFR BLD AUTO: 1.8 %
ERYTHROCYTE [SEDIMENTATION RATE] IN BLOOD BY WESTERGREN METHOD: 7 MM/HR
GLUCOSE SERPL-MCNC: 96 MG/DL
HCT VFR BLD CALC: 37.1 %
HGB BLD-MCNC: 12.4 G/DL
IMM GRANULOCYTES NFR BLD AUTO: 0.2 %
LYMPHOCYTES # BLD AUTO: 2.19 K/UL
LYMPHOCYTES NFR BLD AUTO: 39.5 %
MAN DIFF?: NORMAL
MCHC RBC-ENTMCNC: 30 PG
MCHC RBC-ENTMCNC: 33.4 GM/DL
MCV RBC AUTO: 89.8 FL
MONOCYTES # BLD AUTO: 0.37 K/UL
MONOCYTES NFR BLD AUTO: 6.7 %
NEUTROPHILS # BLD AUTO: 2.85 K/UL
NEUTROPHILS NFR BLD AUTO: 51.3 %
PLATELET # BLD AUTO: 206 K/UL
POTASSIUM SERPL-SCNC: 4.4 MMOL/L
PROT SERPL-MCNC: 7 G/DL
RBC # BLD: 4.13 M/UL
RBC # FLD: 12.9 %
SODIUM SERPL-SCNC: 141 MMOL/L
WBC # FLD AUTO: 5.55 K/UL

## 2022-07-19 ENCOUNTER — TRANSCRIPTION ENCOUNTER (OUTPATIENT)
Age: 42
End: 2022-07-19

## 2022-07-28 ENCOUNTER — NON-APPOINTMENT (OUTPATIENT)
Age: 42
End: 2022-07-28

## 2022-07-28 ENCOUNTER — APPOINTMENT (OUTPATIENT)
Dept: RHEUMATOLOGY | Facility: CLINIC | Age: 42
End: 2022-07-28

## 2022-07-28 PROCEDURE — 99441: CPT

## 2022-07-28 NOTE — HISTORY OF PRESENT ILLNESS
[Home] : at home, [unfilled] , at the time of the visit. [Medical Office: ([facility])___] : at the medical office located in  [Verbal consent obtained from patient] : the patient, [unfilled] [Time Spent: ___ minutes] : I have spent [unfilled] minutes with the patient on the telephone [FreeTextEntry1] : the patient c/o headache, head congestion, sore throat and cough and chest pain; no fever\par sms started 7/27/2022\par tested positive today for COVID19 with home ag test\par \par Has history of ILD and being tx with cellcept\par I explained risks to the patient\par if sms continue to progress - will start Paxlovid- prescription is called in\par \par FU 2 weeks to assure that there is no worsening of ILD

## 2022-10-04 ENCOUNTER — NON-APPOINTMENT (OUTPATIENT)
Age: 42
End: 2022-10-04

## 2023-01-27 ENCOUNTER — LABORATORY RESULT (OUTPATIENT)
Age: 43
End: 2023-01-27

## 2023-01-27 ENCOUNTER — APPOINTMENT (OUTPATIENT)
Dept: RHEUMATOLOGY | Facility: CLINIC | Age: 43
End: 2023-01-27
Payer: COMMERCIAL

## 2023-01-27 VITALS
TEMPERATURE: 97 F | WEIGHT: 152 LBS | SYSTOLIC BLOOD PRESSURE: 114 MMHG | HEIGHT: 67 IN | DIASTOLIC BLOOD PRESSURE: 81 MMHG | HEART RATE: 58 BPM | BODY MASS INDEX: 23.86 KG/M2 | OXYGEN SATURATION: 96 %

## 2023-01-27 DIAGNOSIS — E78.1 PURE HYPERGLYCERIDEMIA: ICD-10-CM

## 2023-01-27 PROCEDURE — 99214 OFFICE O/P EST MOD 30 MIN: CPT

## 2023-01-27 NOTE — PHYSICAL EXAM
[General Appearance - Alert] : alert [General Appearance - In No Acute Distress] : in no acute distress [Sclera] : the sclera and conjunctiva were normal [Respiration, Rhythm And Depth] : normal respiratory rhythm and effort [Auscultation Breath Sounds / Voice Sounds] : lungs were clear to auscultation bilaterally [Heart Rate And Rhythm] : heart rate was normal and rhythm regular [Edema] : there was no peripheral edema [Abdomen Soft] : soft [] : no rash [FreeTextEntry1] : no  hands [No Focal Deficits] : no focal deficits [Oriented To Time, Place, And Person] : oriented to person, place, and time

## 2023-01-27 NOTE — ASSESSMENT
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 cause\par \par = labs , repeat seology\par = continue off Cellcept\par = repeat PFT\par \par  \par \par RTO 6 months

## 2023-01-27 NOTE — HISTORY OF PRESENT ILLNESS
[de-identified] : Last was seen in July, 2022 [FreeTextEntry1] : Interval history :\par -------------------\par -  no shortness of breath, no cough \par -  runner\par -  last PFT was reviewed from 4/2022 : FVC 84; TLC 85; DLCO 95\par - CT chest was done 12/2022 : small in lower lobes symmetric bilateral ground glass and reticular opacities with no significant changes - personally reviewed images and results today\par - was found to have high TG, but not on meds yet- trying a diet\par

## 2023-01-30 LAB
ALBUMIN SERPL ELPH-MCNC: 5.1 G/DL
ALP BLD-CCNC: 58 U/L
ALT SERPL-CCNC: 17 U/L
ANA PAT FLD IF-IMP: ABNORMAL
ANA SER IF-ACNC: ABNORMAL
ANION GAP SERPL CALC-SCNC: 10 MMOL/L
APPEARANCE: CLEAR
AST SERPL-CCNC: 23 U/L
BACTERIA: NEGATIVE
BASOPHILS # BLD AUTO: 0.03 K/UL
BASOPHILS NFR BLD AUTO: 0.6 %
BILIRUB SERPL-MCNC: 1 MG/DL
BILIRUBIN URINE: NEGATIVE
BLOOD URINE: NEGATIVE
BUN SERPL-MCNC: 10 MG/DL
CALCIUM SERPL-MCNC: 9.7 MG/DL
CHLORIDE SERPL-SCNC: 102 MMOL/L
CHOLEST SERPL-MCNC: 189 MG/DL
CK SERPL-CCNC: 198 U/L
CO2 SERPL-SCNC: 27 MMOL/L
COLOR: COLORLESS
CREAT SERPL-MCNC: 0.69 MG/DL
CREAT SPEC-SCNC: 20 MG/DL
CREAT/PROT UR: NORMAL RATIO
CRP SERPL-MCNC: <3 MG/L
DSDNA AB SER-ACNC: <12 IU/ML
EGFR: 110 ML/MIN/1.73M2
EOSINOPHIL # BLD AUTO: 0.07 K/UL
EOSINOPHIL NFR BLD AUTO: 1.3 %
GLUCOSE QUALITATIVE U: NEGATIVE
GLUCOSE SERPL-MCNC: 99 MG/DL
HCT VFR BLD CALC: 41 %
HDLC SERPL-MCNC: 41 MG/DL
HGB BLD-MCNC: 13.7 G/DL
HYALINE CASTS: 0 /LPF
IMM GRANULOCYTES NFR BLD AUTO: 0.2 %
KETONES URINE: NEGATIVE
LDH SERPL-CCNC: 189 U/L
LDLC SERPL CALC-MCNC: 110 MG/DL
LEUKOCYTE ESTERASE URINE: NEGATIVE
LYMPHOCYTES # BLD AUTO: 1.97 K/UL
LYMPHOCYTES NFR BLD AUTO: 36.6 %
MAN DIFF?: NORMAL
MCHC RBC-ENTMCNC: 30.4 PG
MCHC RBC-ENTMCNC: 33.4 GM/DL
MCV RBC AUTO: 91.1 FL
MICROSCOPIC-UA: NORMAL
MONOCYTES # BLD AUTO: 0.28 K/UL
MONOCYTES NFR BLD AUTO: 5.2 %
MYOGLOBIN SERPL-MCNC: 47 NG/ML
NEUTROPHILS # BLD AUTO: 3.02 K/UL
NEUTROPHILS NFR BLD AUTO: 56.1 %
NITRITE URINE: NEGATIVE
NONHDLC SERPL-MCNC: 148 MG/DL
PH URINE: 6
PLATELET # BLD AUTO: 230 K/UL
POTASSIUM SERPL-SCNC: 4.2 MMOL/L
PROT SERPL-MCNC: 7.9 G/DL
PROT UR-MCNC: <4 MG/DL
PROTEIN URINE: NEGATIVE
RBC # BLD: 4.5 M/UL
RBC # FLD: 12.4 %
RED BLOOD CELLS URINE: 1 /HPF
SODIUM SERPL-SCNC: 139 MMOL/L
SPECIFIC GRAVITY URINE: 1.01
SQUAMOUS EPITHELIAL CELLS: 2 /HPF
TRIGL SERPL-MCNC: 188 MG/DL
UROBILINOGEN URINE: NORMAL
WBC # FLD AUTO: 5.38 K/UL
WHITE BLOOD CELLS URINE: 1 /HPF

## 2023-01-31 LAB
B2 GLYCOPROT1 IGA SERPL IA-ACNC: <5 SAU
B2 GLYCOPROT1 IGG SER-ACNC: <5 SGU
B2 GLYCOPROT1 IGM SER-ACNC: <5 SMU
CARDIOLIPIN IGM SER-MCNC: 10.1 MPL
CARDIOLIPIN IGM SER-MCNC: <5 GPL
DEPRECATED CARDIOLIPIN IGA SER: <5 APL
ENA JO1 AB SER IA-ACNC: <0.2 AL
ENA RNP AB SER IA-ACNC: <0.2 AL
ENA SM AB SER IA-ACNC: <0.2 AL
ENA SS-A AB SER IA-ACNC: <0.2 AL
ENA SS-B AB SER IA-ACNC: <0.2 AL

## 2023-02-10 ENCOUNTER — APPOINTMENT (OUTPATIENT)
Dept: PULMONOLOGY | Facility: CLINIC | Age: 43
End: 2023-02-10
Payer: COMMERCIAL

## 2023-02-10 VITALS
WEIGHT: 153 LBS | TEMPERATURE: 98.2 F | OXYGEN SATURATION: 98 % | BODY MASS INDEX: 23.96 KG/M2 | DIASTOLIC BLOOD PRESSURE: 68 MMHG | HEART RATE: 78 BPM | SYSTOLIC BLOOD PRESSURE: 110 MMHG

## 2023-02-10 PROCEDURE — 99214 OFFICE O/P EST MOD 30 MIN: CPT

## 2023-02-10 NOTE — PHYSICAL EXAM
[Well Groomed] : well groomed [No Neck Mass] : no neck mass [Normal S1, S2] : normal s1, s2 [No Resp Distress] : no resp distress [Clear to Auscultation Bilaterally] : clear to auscultation bilaterally [No HSM] : no hsm [Normal Gait] : normal gait [No Clubbing] : no clubbing [No Cyanosis] : no cyanosis [No Edema] : no edema [Normal Turgor] : normal turgor [No Focal Deficits] : no focal deficits [Oriented x3] : oriented x3 [Normal Affect] : normal affect [No Acute Distress] : no acute distress [Normal Appearance] : normal appearance [Normal Rate/Rhythm] : normal rate/rhythm [Not Tender] : not tender

## 2023-02-10 NOTE — PROCEDURE
[FreeTextEntry1] : PFT 9/2/20: No obstruction. Moderate restriction. Moderate reduction in diffusion. No significant improvement after bronchodilator. \par \par PFT 1/28/21: Spirometry was normal. Mild restriction. Mild reduction in diffusion. Significant improvement noted when compared to the above study of 9/2/20. \par \par PFT 7/7/21: Spirometry was normal. Mild restriction. Diffusion capacity was normal. No significant improvement after bronchodilator. \par \par PFT 4/1/22: Spirometry was normal. Lung volumes were normal. Diffusion capacity was normal. No significant improvement after bronchodilator. \par \par CT Chest 9/15/20: Increased bibasilar subpleural markings with ground glass opacity and traction bronchiectasis. No honeycombing. No lymphadenopathy. No pleural effusion. \par \par CT Chest 11/16/21: Decreased interstitial and ground glass opacities. \par \par CT Chest 12/29/22: Bilateral lower lobe ground glass and reticular opacities, unchanged.

## 2023-02-10 NOTE — DISCUSSION/SUMMARY
[FreeTextEntry1] : She is a 42 year old woman who presented with dyspnea on exertion in 2020. Prior to that she had been in good health. She was born in [place] and came [place] the [place] at the age of 2. She does not recall any childhood illnesses. She had been in excellent health and was able to run marathons. In April of 2019 she ran a full 26 mile marathon in [place] and another one in November of 2019 in [place]. In early 2020 she was able to run 10 miles without difficulty. In April of 2020 she started noticing that she could no longer run without becoming short of breath. She had difficulty in running a 5K race and took her almost one hour to do this. Had started working from home due to the coronavirus pandemic. Prior to this she was working in [place] and commuting via the subway. May 2020 she adopted two dogs. She was never been allergic to dogs. She had no other pets. She lives in a two-family home with her boyfriend.  The home is heated with radiators and there are window units for air conditioning. She has two HEPA filters. She stopped smoking in about 2017. She was not taking any medications. She traveled to [place] in 2017. 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 She was placed on prednisone and CellCept. Her condition improved. CellCept stopped in March 2022. \par \par 4/1/22: Off CellCept. Doing well. Ran one half marathon. Considering full marathon in November. CT improved but not resolved. PFT has normalized. Will observe for now. Follow up CT Chest in 6 months. Sooner if any change in symptoms. Will see her in November. \par \par 2/10/23: Feels well. Running 5 to 10 miles at a time. May be entering a marathon again. Remains off CellCept. She is clinically and radiographically stable. Will obtain PFT.

## 2023-02-10 NOTE — REVIEW OF SYSTEMS
[Fatigue] : no fatigue [Cough] : no cough [Sputum] : no sputum [Wheezing] : no wheezing [SOB on Exertion] : no sob on exertion [Chest Discomfort] : no chest discomfort [Edema] : no edema [Palpitations] : no palpitations [GERD] : no gerd [Dysuria] : no dysuria [Arthralgias] : no arthralgias [Myalgias] : no myalgias [Raynaud] : no raynaud [Rash] : no rash [Headache] : no headache [Anxiety] : no anxiety [Diabetes] : no diabetes [Thyroid Problem] : no thyroid problem

## 2023-02-10 NOTE — HISTORY OF PRESENT ILLNESS
[Former] : former [TextBox_4] : She is a 42 year old woman who presented with dyspnea on exertion in September of 2020. \par \par Prior to that she had been in good health. She was born in [place] and came [place] the [place] at the age of 2. She does not recall any childhood illnesses. She had been in excellent health and was able to run marathons. In April of 2019 she ran a full 26 mile marathon in [place] and another one in November of 2019 in [place]. In the early part of 2020 she was able to run 10 miles without difficulty. In  April 2020 she started noticing that she could no longer run without becoming short of breath. She had difficulty in running a 5K race and took her almost one hour to do this. In early 2020 she started working from home due to the coronavirus pandemic. 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 In May of 2020 she adopted two dogs. She was never been allergic to dogs. She had no other pets. She lives in a two-family home with her boyfriend. The home is heated with radiators and there are window units for air conditioning. She stopped smoking in about 2017. She was not taking any medications. She traveled to [place] in 2017. 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 She was placed on prednisone and CellCept. Her condition improved. \par \par 7/7/21: Has been running again without a problem. Training for the [employer]. \par \par 4/1/22: Recently ran a one half marathon without difficulty. No longer on CellCept - for past month. \par \par 2/10/23: Feels well. Running 5 to 10 miles at a time. May be entering a marathon again. Remains off CellCept.  [YearQuit] : 2017

## 2023-02-12 LAB
EJ AB SER QL: NEGATIVE
ENA JO1 AB SER IA-ACNC: <20 UNITS
ENA PM/SCL AB SER-ACNC: <20 UNITS
ENA SM+RNP AB SER IA-ACNC: <20 UNITS
ENA SS-A IGG SER QL: <20 UNITS
FIBRILLARIN AB SER QL: NEGATIVE
KU AB SER QL: NEGATIVE
MDA-5 (P140)(CADM-140): <20 UNITS
MI2 AB SER QL: NEGATIVE
NXP-2 (P140): <20 UNITS
OJ AB SER QL: NEGATIVE
PL12 AB SER QL: NEGATIVE
PL7 AB SER QL: NEGATIVE
SRP AB SERPL QL: NEGATIVE
TIF GAMMA (P155/140): <20 UNITS
U2 SNRNP AB SER QL: NEGATIVE

## 2023-02-13 ENCOUNTER — TRANSCRIPTION ENCOUNTER (OUTPATIENT)
Age: 43
End: 2023-02-13

## 2023-02-15 ENCOUNTER — APPOINTMENT (OUTPATIENT)
Dept: PULMONOLOGY | Facility: CLINIC | Age: 43
End: 2023-02-15
Payer: COMMERCIAL

## 2023-02-15 PROCEDURE — 94060 EVALUATION OF WHEEZING: CPT

## 2023-02-15 PROCEDURE — 94729 DIFFUSING CAPACITY: CPT

## 2023-02-15 PROCEDURE — 94727 GAS DIL/WSHOT DETER LNG VOL: CPT

## 2023-02-16 ENCOUNTER — TRANSCRIPTION ENCOUNTER (OUTPATIENT)
Age: 43
End: 2023-02-16

## 2023-02-22 ENCOUNTER — TRANSCRIPTION ENCOUNTER (OUTPATIENT)
Age: 43
End: 2023-02-22

## 2023-02-24 ENCOUNTER — TRANSCRIPTION ENCOUNTER (OUTPATIENT)
Age: 43
End: 2023-02-24

## 2023-03-30 ENCOUNTER — TRANSCRIPTION ENCOUNTER (OUTPATIENT)
Age: 43
End: 2023-03-30

## 2023-05-19 ENCOUNTER — APPOINTMENT (OUTPATIENT)
Dept: RHEUMATOLOGY | Facility: CLINIC | Age: 43
End: 2023-05-19
Payer: COMMERCIAL

## 2023-05-19 ENCOUNTER — TRANSCRIPTION ENCOUNTER (OUTPATIENT)
Age: 43
End: 2023-05-19

## 2023-05-19 VITALS
WEIGHT: 153 LBS | TEMPERATURE: 98.3 F | DIASTOLIC BLOOD PRESSURE: 81 MMHG | HEIGHT: 67 IN | HEART RATE: 89 BPM | SYSTOLIC BLOOD PRESSURE: 118 MMHG | BODY MASS INDEX: 24.01 KG/M2 | OXYGEN SATURATION: 98 %

## 2023-05-19 PROCEDURE — 99214 OFFICE O/P EST MOD 30 MIN: CPT

## 2023-05-19 RX ORDER — DESLORATADINE 5 MG/1
5 TABLET ORAL
Qty: 90 | Refills: 1 | Status: ACTIVE | COMMUNITY
Start: 2020-11-18 | End: 1900-01-01

## 2023-05-19 RX ORDER — DIAPER,BRIEF,INFANT-TODD,DISP
500-400 EACH MISCELLANEOUS
Qty: 60 | Refills: 0 | Status: DISCONTINUED | COMMUNITY
Start: 2020-10-06 | End: 2023-05-19

## 2023-05-19 RX ORDER — MYCOPHENOLATE MOFETIL 500 MG/1
500 TABLET ORAL
Qty: 270 | Refills: 1 | Status: COMPLETED | COMMUNITY
Start: 2020-10-20 | End: 2022-08-19

## 2023-05-19 RX ORDER — NIRMATRELVIR AND RITONAVIR 300-100 MG
20 X 150 MG & KIT ORAL
Qty: 30 | Refills: 0 | Status: DISCONTINUED | COMMUNITY
Start: 2022-07-28 | End: 2023-05-19

## 2023-05-20 NOTE — HISTORY OF PRESENT ILLNESS
[de-identified] : Last was seen in July, 2022 [FreeTextEntry1] : Interval history :\par -------------------\par - new rash behind ear and temple area , no pruritic\par - no shortness of breath, no cough \par - runner, no problem with training\par -  did not repeat PFT ; last PFT was reviewed from 4/2022 : FVC 84; TLC 85; DLCO 95\par - CT chest was done 12/2022 : small in lower lobes symmetric bilateral ground glass and reticular opacities with no significant changes - personally reviewed images and results today\par - was found to have high TG, but not on meds yet- trying a diet\par

## 2023-05-20 NOTE — ASSESSMENT
[FreeTextEntry1] : \par Hypomyopathic ( high CPK- emia)   ILD now stable\par had  "  hands" but Ni-1 and anti- synthetase ab- negative, SSA 52 positive , but SSA/SSB negative \par PFT 9/2/20: No obstruction. Moderate restriction. Moderate reduction in diffusion. No significant improvement after bronchodilator. Major improvement on PFT's on 7/2021\par CT 12/2022 - mild stable  bibasilar subpleural markings  \par off CellCept since 2022 , \par Seasonal allergies / Eosinophilia \par Gastritis- steroid induced - improved\par \par = labs \par = PFT\par = desloratadine - renewed\par \par  \par \par RTO 12 weeks.

## 2023-05-22 ENCOUNTER — TRANSCRIPTION ENCOUNTER (OUTPATIENT)
Age: 43
End: 2023-05-22

## 2023-06-01 ENCOUNTER — TRANSCRIPTION ENCOUNTER (OUTPATIENT)
Age: 43
End: 2023-06-01

## 2023-06-02 ENCOUNTER — TRANSCRIPTION ENCOUNTER (OUTPATIENT)
Age: 43
End: 2023-06-02

## 2023-06-02 ENCOUNTER — APPOINTMENT (OUTPATIENT)
Dept: INTERNAL MEDICINE | Facility: CLINIC | Age: 43
End: 2023-06-02
Payer: COMMERCIAL

## 2023-06-02 VITALS
WEIGHT: 156 LBS | HEART RATE: 60 BPM | TEMPERATURE: 98.1 F | OXYGEN SATURATION: 98 % | HEIGHT: 67 IN | DIASTOLIC BLOOD PRESSURE: 78 MMHG | BODY MASS INDEX: 24.48 KG/M2 | SYSTOLIC BLOOD PRESSURE: 113 MMHG

## 2023-06-02 DIAGNOSIS — Z78.9 OTHER SPECIFIED HEALTH STATUS: ICD-10-CM

## 2023-06-02 DIAGNOSIS — Z87.891 PERSONAL HISTORY OF NICOTINE DEPENDENCE: ICD-10-CM

## 2023-06-02 DIAGNOSIS — Z13.29 ENCOUNTER FOR SCREENING FOR OTHER SUSPECTED ENDOCRINE DISORDER: ICD-10-CM

## 2023-06-02 DIAGNOSIS — Z80.3 FAMILY HISTORY OF MALIGNANT NEOPLASM OF BREAST: ICD-10-CM

## 2023-06-02 DIAGNOSIS — Z71.85 ENCOUNTER FOR IMMUNIZATION SAFETY COUNSELING: ICD-10-CM

## 2023-06-02 DIAGNOSIS — Z00.00 ENCOUNTER FOR GENERAL ADULT MEDICAL EXAMINATION W/OUT ABNORMAL FINDINGS: ICD-10-CM

## 2023-06-02 DIAGNOSIS — R89.9 UNSPECIFIED ABNORMAL FINDING IN SPECIMENS FROM OTHER ORGANS, SYSTEMS AND TISSUES: ICD-10-CM

## 2023-06-02 DIAGNOSIS — U07.1 COVID-19: ICD-10-CM

## 2023-06-02 DIAGNOSIS — Z87.39 PERSONAL HISTORY OF OTHER DISEASES OF THE MUSCULOSKELETAL SYSTEM AND CONNECTIVE TISSUE: ICD-10-CM

## 2023-06-02 DIAGNOSIS — Z87.898 PERSONAL HISTORY OF OTHER SPECIFIED CONDITIONS: ICD-10-CM

## 2023-06-02 DIAGNOSIS — Z92.29 PERSONAL HISTORY OF OTHER DRUG THERAPY: ICD-10-CM

## 2023-06-02 DIAGNOSIS — Z13.228 ENCOUNTER FOR SCREENING FOR OTHER SUSPECTED ENDOCRINE DISORDER: ICD-10-CM

## 2023-06-02 DIAGNOSIS — Z79.899 IMMUNODEFICIENCY DUE TO DRUGS: ICD-10-CM

## 2023-06-02 DIAGNOSIS — D84.821 IMMUNODEFICIENCY DUE TO DRUGS: ICD-10-CM

## 2023-06-02 DIAGNOSIS — Z79.899 OTHER LONG TERM (CURRENT) DRUG THERAPY: ICD-10-CM

## 2023-06-02 DIAGNOSIS — Z82.49 FAMILY HISTORY OF ISCHEMIC HEART DISEASE AND OTHER DISEASES OF THE CIRCULATORY SYSTEM: ICD-10-CM

## 2023-06-02 DIAGNOSIS — Z83.438 FAMILY HISTORY OF OTHER DISORDER OF LIPOPROTEIN METABOLISM AND OTHER LIPIDEMIA: ICD-10-CM

## 2023-06-02 DIAGNOSIS — Z88.9 ALLERGY STATUS TO UNSPECIFIED DRUGS, MEDICAMENTS AND BIOLOGICAL SUBSTANCES: ICD-10-CM

## 2023-06-02 DIAGNOSIS — Z13.0 ENCOUNTER FOR SCREENING FOR OTHER SUSPECTED ENDOCRINE DISORDER: ICD-10-CM

## 2023-06-02 DIAGNOSIS — Z23 ENCOUNTER FOR IMMUNIZATION: ICD-10-CM

## 2023-06-02 DIAGNOSIS — Z29.8 ENCOUNTER FOR OTHER SPECIFIED PROPHYLACTIC MEASURES: ICD-10-CM

## 2023-06-02 DIAGNOSIS — Z11.59 ENCOUNTER FOR SCREENING FOR OTHER VIRAL DISEASES: ICD-10-CM

## 2023-06-02 DIAGNOSIS — Z83.3 FAMILY HISTORY OF DIABETES MELLITUS: ICD-10-CM

## 2023-06-02 DIAGNOSIS — R93.89 ABNORMAL FINDINGS ON DIAGNOSTIC IMAGING OF OTHER SPECIFIED BODY STRUCTURES: ICD-10-CM

## 2023-06-02 DIAGNOSIS — R22.1 LOCALIZED SWELLING, MASS AND LUMP, NECK: ICD-10-CM

## 2023-06-02 DIAGNOSIS — Z80.0 FAMILY HISTORY OF MALIGNANT NEOPLASM OF DIGESTIVE ORGANS: ICD-10-CM

## 2023-06-02 PROCEDURE — 36415 COLL VENOUS BLD VENIPUNCTURE: CPT

## 2023-06-02 PROCEDURE — 99386 PREV VISIT NEW AGE 40-64: CPT | Mod: 25

## 2023-06-02 RX ORDER — PSYLLIUM HUSK 0.4 G
CAPSULE ORAL
Refills: 0 | Status: DISCONTINUED | COMMUNITY
End: 2023-06-02

## 2023-06-05 ENCOUNTER — TRANSCRIPTION ENCOUNTER (OUTPATIENT)
Age: 43
End: 2023-06-05

## 2023-06-05 DIAGNOSIS — R73.03 PREDIABETES.: ICD-10-CM

## 2023-06-05 PROBLEM — Z88.9 HISTORY OF ALLERGY: Status: RESOLVED | Noted: 2020-09-25 | Resolved: 2023-06-05

## 2023-06-05 PROBLEM — Z87.39 HISTORY OF MYOSITIS: Status: RESOLVED | Noted: 2020-09-21 | Resolved: 2022-03-08

## 2023-06-05 PROBLEM — Z87.891 HISTORY OF SMOKING LESS THAN 10 PACK YEARS: Status: ACTIVE | Noted: 2023-06-05

## 2023-06-05 PROBLEM — R93.89 ABNORMAL CHEST X-RAY: Noted: 2020-09-03

## 2023-06-05 PROBLEM — R89.9 ABNORMAL LABORATORY TEST RESULT: Status: RESOLVED | Noted: 2021-08-16 | Resolved: 2023-06-05

## 2023-06-05 LAB
CHOLEST SERPL-MCNC: 152 MG/DL
ESTIMATED AVERAGE GLUCOSE: 120 MG/DL
HBA1C MFR BLD HPLC: 5.8 %
HBV CORE IGG+IGM SER QL: NONREACTIVE
HBV SURFACE AB SER QL: REACTIVE
HCV AB SER QL: NONREACTIVE
HCV S/CO RATIO: 0.08 S/CO
HDLC SERPL-MCNC: 41 MG/DL
LDLC SERPL CALC-MCNC: 68 MG/DL
NONHDLC SERPL-MCNC: 111 MG/DL
TRIGL SERPL-MCNC: 213 MG/DL
TSH SERPL-ACNC: 1.8 UIU/ML

## 2023-06-05 NOTE — ASSESSMENT
[FreeTextEntry1] : Assessment as indicated above in impressions with plans through orders below. Lab orders placed, serum being drawn in office today.  Interested in getting pap smear.

## 2023-06-05 NOTE — HEALTH RISK ASSESSMENT
[Patient reported PAP Smear was normal] : Patient reported PAP Smear was normal [None] : None [# of Members in Household ___] :  household currently consist of [unfilled] member(s) [Employed] : employed [] :  [# Of Children ___] : has [unfilled] children [Sexually Active] : sexually active [Feels Safe at Home] : Feels safe at home [Smoke Detector] : smoke detector [Carbon Monoxide Detector] : carbon monoxide detector [Seat Belt] :  uses seat belt [Former] : Former [10-14] : 10-14 [< 15 Years] : < 15 Years [Reports changes in hearing] : Reports no changes in hearing [Reports changes in vision] : Reports no changes in vision [Reports changes in dental health] : Reports no changes in dental health [PapSmearDate] : 02/22 [de-identified] : Quit 2017

## 2023-06-05 NOTE — HISTORY OF PRESENT ILLNESS
[FreeTextEntry1] : meet new primary care provider [de-identified] : Patient presents for first time visit to this provider for her annual check up.\par \par Recently resolving from myositis of unknown etiology.  Otherwise has no other health issues for most part. \par \par Vaccinated: pfizer: 3/14/21, 4/4/21 9/16/21 and 1/9/22\par \par flu shot 9/22\par tdap 1/2021\par \par Pregnant one time, aborted one time. \par \par Last pap smear: April, 2022  told normal\par \par Occupation: . \par \par Gyne: currently no contaception\par \par Surgery: hammer toe repair, wisdom teeth extraction

## 2023-06-07 ENCOUNTER — TRANSCRIPTION ENCOUNTER (OUTPATIENT)
Age: 43
End: 2023-06-07

## 2023-06-08 ENCOUNTER — NON-APPOINTMENT (OUTPATIENT)
Age: 43
End: 2023-06-08

## 2023-06-20 ENCOUNTER — APPOINTMENT (OUTPATIENT)
Dept: RHEUMATOLOGY | Facility: CLINIC | Age: 43
End: 2023-06-20
Payer: COMMERCIAL

## 2023-06-20 DIAGNOSIS — R76.8 OTHER SPECIFIED ABNORMAL IMMUNOLOGICAL FINDINGS IN SERUM: ICD-10-CM

## 2023-06-20 DIAGNOSIS — J84.9 INTERSTITIAL PULMONARY DISEASE, UNSPECIFIED: ICD-10-CM

## 2023-06-20 DIAGNOSIS — R05.3 CHRONIC COUGH: ICD-10-CM

## 2023-06-20 PROCEDURE — 99214 OFFICE O/P EST MOD 30 MIN: CPT | Mod: 95

## 2023-06-20 RX ORDER — FLUTICASONE PROPIONATE AND SALMETEROL XINAFOATE 115; 21 UG/1; UG/1
115-21 AEROSOL, METERED RESPIRATORY (INHALATION) TWICE DAILY
Qty: 1 | Refills: 0 | Status: ACTIVE | COMMUNITY
Start: 1900-01-01 | End: 1900-01-01

## 2023-06-20 RX ORDER — FEXOFENADINE HYDROCHLORIDE 180 MG/1
180 TABLET ORAL DAILY
Qty: 1 | Refills: 3 | Status: ACTIVE | COMMUNITY
Start: 2020-10-06

## 2023-06-20 RX ORDER — ALBUTEROL SULFATE 90 UG/1
108 (90 BASE) INHALANT RESPIRATORY (INHALATION)
Qty: 1 | Refills: 1 | Status: ACTIVE | COMMUNITY
Start: 1900-01-01 | End: 1900-01-01

## 2023-06-21 PROBLEM — R76.8 SS-A ANTIBODY POSITIVE: Status: ACTIVE | Noted: 2023-01-27

## 2023-06-21 PROBLEM — R05.3 CHRONIC COUGH: Status: ACTIVE | Noted: 2023-06-21

## 2023-06-21 NOTE — ASSESSMENT
[FreeTextEntry1] : Worsening of cough in last week - ? environmental / vs due to worsening of ILD\par ILD, CT Chest 9012/2022: I stable residual ground glass opacity and traction bronchiectasis. No honeycombing. No lymphadenopathy\par Patient with hypomyopathic ( high CPK- emia) IIM and ILD with "  hands" but Ni-1 and anti- synthetase ab- negative, SSA 52 positive , but SSA/SSB negative \par off CellCept since 2022 , \par Seasonal allergies / Eosinophilia \par \par \par = repeat labs \par = restart Albuterol, Advair and intranasal c/steroid spray\par = PFT\par - may need repeat CT chest if no improvement\par \par  \par \par RTO  1- 2 week or call earlier if needed

## 2023-06-21 NOTE — PHYSICAL EXAM
[General Appearance - Alert] : alert [General Appearance - In No Acute Distress] : in no acute distress [General Appearance - Well Developed] : well developed [] : no respiratory distress [FreeTextEntry1] : chronic cough [Oriented To Time, Place, And Person] : oriented to person, place, and time [Impaired Insight] : insight and judgment were intact
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
## 2023-06-22 ENCOUNTER — TRANSCRIPTION ENCOUNTER (OUTPATIENT)
Age: 43
End: 2023-06-22

## 2023-06-23 ENCOUNTER — TRANSCRIPTION ENCOUNTER (OUTPATIENT)
Age: 43
End: 2023-06-23

## 2023-06-23 ENCOUNTER — APPOINTMENT (OUTPATIENT)
Dept: PULMONOLOGY | Facility: CLINIC | Age: 43
End: 2023-06-23
Payer: COMMERCIAL

## 2023-06-23 VITALS
SYSTOLIC BLOOD PRESSURE: 110 MMHG | BODY MASS INDEX: 24.12 KG/M2 | OXYGEN SATURATION: 97 % | DIASTOLIC BLOOD PRESSURE: 78 MMHG | WEIGHT: 154 LBS | TEMPERATURE: 96.1 F | HEART RATE: 68 BPM

## 2023-06-23 DIAGNOSIS — R05.9 COUGH, UNSPECIFIED: ICD-10-CM

## 2023-06-23 PROCEDURE — 99213 OFFICE O/P EST LOW 20 MIN: CPT
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
## 2023-06-23 NOTE — REVIEW OF SYSTEMS
[Fever] : no fever [Fatigue] : no fatigue [Cough] : cough [Chest Tightness] : chest tightness [Sputum] : sputum [Wheezing] : no wheezing [SOB on Exertion] : no sob on exertion [Chest Discomfort] : no chest discomfort [Edema] : no edema [Palpitations] : no palpitations [GERD] : no gerd [Dysuria] : no dysuria [Arthralgias] : no arthralgias [Myalgias] : no myalgias [Raynaud] : no raynaud [Rash] : no rash [Headache] : no headache [Anxiety] : no anxiety [Diabetes] : no diabetes [Thyroid Problem] : no thyroid problem

## 2023-06-23 NOTE — PHYSICAL EXAM
[No Acute Distress] : no acute distress [Normal Appearance] : normal appearance [Well Groomed] : well groomed [No Neck Mass] : no neck mass [Normal Rate/Rhythm] : normal rate/rhythm [Normal S1, S2] : normal s1, s2 [No Resp Distress] : no resp distress [Not Tender] : not tender [No HSM] : no hsm [Normal Gait] : normal gait [No Clubbing] : no clubbing [No Cyanosis] : no cyanosis [No Edema] : no edema [Normal Turgor] : normal turgor [No Focal Deficits] : no focal deficits [Oriented x3] : oriented x3 [Normal Affect] : normal affect [Rhonchi] : rhonchi
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
## 2023-06-27 ENCOUNTER — TRANSCRIPTION ENCOUNTER (OUTPATIENT)
Age: 43
End: 2023-06-27

## 2023-06-27 ENCOUNTER — APPOINTMENT (OUTPATIENT)
Dept: RHEUMATOLOGY | Facility: CLINIC | Age: 43
End: 2023-06-27

## 2023-06-28 ENCOUNTER — TRANSCRIPTION ENCOUNTER (OUTPATIENT)
Age: 43
End: 2023-06-28

## 2023-06-28 ENCOUNTER — APPOINTMENT (OUTPATIENT)
Dept: INTERNAL MEDICINE | Facility: CLINIC | Age: 43
End: 2023-06-28
Payer: COMMERCIAL

## 2023-06-28 VITALS
HEIGHT: 67 IN | TEMPERATURE: 97.7 F | SYSTOLIC BLOOD PRESSURE: 114 MMHG | OXYGEN SATURATION: 98 % | DIASTOLIC BLOOD PRESSURE: 73 MMHG | WEIGHT: 155 LBS | HEART RATE: 59 BPM | BODY MASS INDEX: 24.33 KG/M2

## 2023-06-28 DIAGNOSIS — N95.1 MENOPAUSAL AND FEMALE CLIMACTERIC STATES: ICD-10-CM

## 2023-06-28 DIAGNOSIS — R92.8 OTHER ABNORMAL AND INCONCLUSIVE FINDINGS ON DIAGNOSTIC IMAGING OF BREAST: ICD-10-CM

## 2023-06-28 PROCEDURE — 99214 OFFICE O/P EST MOD 30 MIN: CPT

## 2023-06-28 NOTE — HEALTH RISK ASSESSMENT
[Never] : Never [de-identified] : runs marathons [de-identified] : healthy, no red meat, daily and veggies

## 2023-06-28 NOTE — ASSESSMENT
[FreeTextEntry1] : HM - pt not due for pap, reviewed guidelines with pt for BC and cervical cancer screening. Pt encouraged to schedule c-scope for CRC screening.

## 2023-06-28 NOTE — PAST MEDICAL HISTORY
[Menstruating] : menstruating [Menarche Age ____] : age at menarche was [unfilled] [Definite ___ (Date)] : the last menstrual period was [unfilled] [Normal Amount/Duration] : it was of a normal amount and duration [Regular Cycle Intervals] : have been regular [Total Preg ___] : G[unfilled] [Live Births ___] : P[unfilled]  [Abortions ___] : Abortions:[unfilled]

## 2023-06-28 NOTE — HISTORY OF PRESENT ILLNESS
[FreeTextEntry1] : here for pap [de-identified] : 44 yo F, no significant PMHx, thought she needed a pap yearly and lost her gyne with a recent insurance change. Never had an abnormal, not sure if she had it with HPV.\par Getting annual mammo with US since age 38, had one biopsy, fibroadenoma, nothing abnormal since. Lesia risk 0.7% compaed with 0.5% average, someone with BC on her father's side, no other cancers.

## 2023-06-28 NOTE — PHYSICAL EXAM
[No Respiratory Distress] : no respiratory distress  [Normal Gait] : normal gait [Speech Grossly Normal] : speech grossly normal [Normal Mood] : the mood was normal [Normal] : affect was normal and insight and judgment were intact

## 2023-06-28 NOTE — REVIEW OF SYSTEMS
[FreeTextEntry2] : recent night sweats, upper body only, not severe [FreeTextEntry8] : sexually active with , no issues

## 2023-07-13 ENCOUNTER — RESULT REVIEW (OUTPATIENT)
Age: 43
End: 2023-07-13

## 2023-07-13 ENCOUNTER — APPOINTMENT (OUTPATIENT)
Dept: MAMMOGRAPHY | Facility: CLINIC | Age: 43
End: 2023-07-13
Payer: COMMERCIAL

## 2023-07-13 ENCOUNTER — APPOINTMENT (OUTPATIENT)
Dept: ULTRASOUND IMAGING | Facility: CLINIC | Age: 43
End: 2023-07-13
Payer: COMMERCIAL

## 2023-07-13 PROCEDURE — 77067 SCR MAMMO BI INCL CAD: CPT

## 2023-07-13 PROCEDURE — 77063 BREAST TOMOSYNTHESIS BI: CPT

## 2023-07-13 PROCEDURE — 76641 ULTRASOUND BREAST COMPLETE: CPT | Mod: 50

## 2023-07-21 NOTE — DISCUSSION/SUMMARY
[FreeTextEntry1] : She is a 42 year old woman who presented with dyspnea on exertion in 2020. \par \par Prior to that she had been in good health. She was born in [place] and came [place] at the age of 2. She does not recall any childhood illnesses. She had been in excellent health and was able to run marathons. In April of 2019 she ran a full 26 mile marathon in [place] and another one in November of 2019 in [place]. In the early part of 2020 she was able to run 10 miles without difficulty. In about April of 2020 she started noticing that she could no longer run without becoming short of breath. She had difficulty in running a 5K race and took her almost one hour to do this.In early 2020 she started working from home due to the coronavirus pandemic. Prior to this she was working in [place] and commuting [place] the subway. May 2020 she adopted two dogs. She was never been allergic to dogs. She had no other pets. She lives in a two-family home with her boyfriend. She resides on the first floor. The home is heated with radiators and there are window units for air conditioning. She has two HEPA filters. She stopped smoking in about 2017. She was not taking any medications. She traveled to [place] in 2017. 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 She was placed on prednisone and CellCept. Her condition improved. CellCept stopped in early March 2022. \par \par 4/1/22: Now off CellCept. Doing well. Ran one half marathon. Considering full marathon in November. CT improved but not resolved. PFT has normalized. Will observe for now. Follow up CT Chest in 6 months. Sooner if any change in symptoms. Will see her in November. \par  98.4

## 2023-07-27 ENCOUNTER — APPOINTMENT (OUTPATIENT)
Dept: GASTROENTEROLOGY | Facility: CLINIC | Age: 43
End: 2023-07-27

## 2023-08-07 ENCOUNTER — TRANSCRIPTION ENCOUNTER (OUTPATIENT)
Age: 43
End: 2023-08-07

## 2023-08-10 ENCOUNTER — TRANSCRIPTION ENCOUNTER (OUTPATIENT)
Age: 43
End: 2023-08-10

## 2023-08-31 ENCOUNTER — TRANSCRIPTION ENCOUNTER (OUTPATIENT)
Age: 43
End: 2023-08-31

## 2023-09-14 ENCOUNTER — TRANSCRIPTION ENCOUNTER (OUTPATIENT)
Age: 43
End: 2023-09-14

## 2023-09-25 ENCOUNTER — TRANSCRIPTION ENCOUNTER (OUTPATIENT)
Age: 43
End: 2023-09-25

## 2023-10-02 ENCOUNTER — TRANSCRIPTION ENCOUNTER (OUTPATIENT)
Age: 43
End: 2023-10-02

## 2023-10-03 ENCOUNTER — TRANSCRIPTION ENCOUNTER (OUTPATIENT)
Age: 43
End: 2023-10-03

## 2023-10-20 ENCOUNTER — NON-APPOINTMENT (OUTPATIENT)
Age: 43
End: 2023-10-20

## 2023-10-20 ENCOUNTER — APPOINTMENT (OUTPATIENT)
Dept: GASTROENTEROLOGY | Facility: CLINIC | Age: 43
End: 2023-10-20
Payer: COMMERCIAL

## 2023-10-20 VITALS
WEIGHT: 150 LBS | HEIGHT: 67 IN | SYSTOLIC BLOOD PRESSURE: 116 MMHG | BODY MASS INDEX: 23.54 KG/M2 | HEART RATE: 76 BPM | OXYGEN SATURATION: 97 % | DIASTOLIC BLOOD PRESSURE: 65 MMHG

## 2023-10-20 DIAGNOSIS — K62.5 HEMORRHAGE OF ANUS AND RECTUM: ICD-10-CM

## 2023-10-20 DIAGNOSIS — Z12.11 ENCOUNTER FOR SCREENING FOR MALIGNANT NEOPLASM OF COLON: ICD-10-CM

## 2023-10-20 PROCEDURE — 99204 OFFICE O/P NEW MOD 45 MIN: CPT

## 2023-10-26 RX ORDER — SODIUM PICOSULFATE, MAGNESIUM OXIDE, AND ANHYDROUS CITRIC ACID 10; 3.5; 12 MG/160ML; G/160ML; G/160ML
10-3.5-12 MG-GM LIQUID ORAL
Qty: 1 | Refills: 0 | Status: ACTIVE | COMMUNITY
Start: 2023-10-20 | End: 1900-01-01

## 2023-10-26 RX ORDER — SODIUM SULFATE, POTASSIUM SULFATE AND MAGNESIUM SULFATE 1.6; 3.13; 17.5 G/177ML; G/177ML; G/177ML
17.5-3.13-1.6 SOLUTION ORAL
Qty: 1 | Refills: 0 | Status: ACTIVE | COMMUNITY
Start: 2023-10-20 | End: 1900-01-01

## 2023-11-28 ENCOUNTER — TRANSCRIPTION ENCOUNTER (OUTPATIENT)
Age: 43
End: 2023-11-28

## 2023-12-11 ENCOUNTER — TRANSCRIPTION ENCOUNTER (OUTPATIENT)
Age: 43
End: 2023-12-11

## 2023-12-12 ENCOUNTER — TRANSCRIPTION ENCOUNTER (OUTPATIENT)
Age: 43
End: 2023-12-12

## 2023-12-13 ENCOUNTER — APPOINTMENT (OUTPATIENT)
Dept: GASTROENTEROLOGY | Facility: AMBULATORY MEDICAL SERVICES | Age: 43
End: 2023-12-13
Payer: COMMERCIAL

## 2023-12-13 PROCEDURE — 45380 COLONOSCOPY AND BIOPSY: CPT | Mod: 33

## 2023-12-19 ENCOUNTER — NON-APPOINTMENT (OUTPATIENT)
Age: 43
End: 2023-12-19

## 2024-02-12 ENCOUNTER — APPOINTMENT (OUTPATIENT)
Dept: PULMONOLOGY | Facility: CLINIC | Age: 44
End: 2024-02-12

## 2024-04-09 ENCOUNTER — TRANSCRIPTION ENCOUNTER (OUTPATIENT)
Age: 44
End: 2024-04-09

## 2024-05-17 ENCOUNTER — APPOINTMENT (OUTPATIENT)
Dept: RHEUMATOLOGY | Facility: CLINIC | Age: 44
End: 2024-05-17

## 2024-11-11 ENCOUNTER — TRANSCRIPTION ENCOUNTER (OUTPATIENT)
Age: 44
End: 2024-11-11

## 2024-11-25 ENCOUNTER — TRANSCRIPTION ENCOUNTER (OUTPATIENT)
Age: 44
End: 2024-11-25

## 2025-01-16 ENCOUNTER — TRANSCRIPTION ENCOUNTER (OUTPATIENT)
Age: 45
End: 2025-01-16

## 2025-01-17 ENCOUNTER — TRANSCRIPTION ENCOUNTER (OUTPATIENT)
Age: 45
End: 2025-01-17

## 2025-04-23 ENCOUNTER — TRANSCRIPTION ENCOUNTER (OUTPATIENT)
Age: 45
End: 2025-04-23